# Patient Record
Sex: FEMALE | Race: BLACK OR AFRICAN AMERICAN | Employment: FULL TIME | URBAN - METROPOLITAN AREA
[De-identification: names, ages, dates, MRNs, and addresses within clinical notes are randomized per-mention and may not be internally consistent; named-entity substitution may affect disease eponyms.]

---

## 2017-02-06 ENCOUNTER — PATIENT MESSAGE (OUTPATIENT)
Dept: ENDOCRINOLOGY CLINIC | Facility: CLINIC | Age: 47
End: 2017-02-06

## 2017-02-07 NOTE — TELEPHONE ENCOUNTER
From: Jud Taylor  To: Sara Cesar MD  Sent: 2/6/2017 9:03 PM CST  Subject: Prescription Question    Good Morning Dr. Angelic Oliveros,     I have 4 days left of my prescription. Can I please have a refill sent to [facility] [address] [address].  Jacy Tobar

## 2017-02-09 ENCOUNTER — TELEPHONE (OUTPATIENT)
Dept: ENDOCRINOLOGY CLINIC | Facility: CLINIC | Age: 47
End: 2017-02-09

## 2017-02-09 RX ORDER — LOSARTAN POTASSIUM AND HYDROCHLOROTHIAZIDE 25; 100 MG/1; MG/1
TABLET ORAL
Qty: 90 TABLET | Refills: 0 | Status: SHIPPED | OUTPATIENT
Start: 2017-02-09 | End: 2017-05-10

## 2017-02-09 RX ORDER — AMLODIPINE BESYLATE 2.5 MG/1
TABLET ORAL
Qty: 90 TABLET | Refills: 0 | Status: SHIPPED | OUTPATIENT
Start: 2017-02-09 | End: 2017-05-10

## 2017-02-09 NOTE — TELEPHONE ENCOUNTER
mg tablet refill was sent by Ketan Hall on 2/7/17 to patient's preferred pharmacy. Re-sent Rx. Emailed patient with this information.

## 2017-02-09 NOTE — TELEPHONE ENCOUNTER
Pt. Calling very upset about her Metformin medication 500mg. She states that she was given Metformin 1000mg from [facility]. She is requesting for Rx to be sent for Metformin 500mg. She also states that she is completely out of her meds.  Pt. Is requestin

## 2017-03-03 ENCOUNTER — OFFICE VISIT (OUTPATIENT)
Dept: FAMILY MEDICINE CLINIC | Facility: CLINIC | Age: 47
End: 2017-03-03

## 2017-03-03 ENCOUNTER — APPOINTMENT (OUTPATIENT)
Dept: LAB | Age: 47
End: 2017-03-03
Attending: FAMILY MEDICINE
Payer: COMMERCIAL

## 2017-03-03 VITALS
RESPIRATION RATE: 17 BRPM | SYSTOLIC BLOOD PRESSURE: 138 MMHG | DIASTOLIC BLOOD PRESSURE: 86 MMHG | WEIGHT: 275 LBS | HEIGHT: 64 IN | HEART RATE: 81 BPM | TEMPERATURE: 98 F | BODY MASS INDEX: 46.95 KG/M2

## 2017-03-03 DIAGNOSIS — IMO0001 UNCONTROLLED TYPE 2 DIABETES MELLITUS WITHOUT COMPLICATION, WITHOUT LONG-TERM CURRENT USE OF INSULIN: ICD-10-CM

## 2017-03-03 DIAGNOSIS — E66.01 MORBID OBESITY DUE TO EXCESS CALORIES (HCC): ICD-10-CM

## 2017-03-03 DIAGNOSIS — I10 ESSENTIAL HYPERTENSION: Primary | ICD-10-CM

## 2017-03-03 LAB
ALBUMIN SERPL BCP-MCNC: 3.4 G/DL (ref 3.5–4.8)
ALBUMIN/GLOB SERPL: 0.7 {RATIO} (ref 1–2)
ALP SERPL-CCNC: 63 U/L (ref 32–100)
ALT SERPL-CCNC: 12 U/L (ref 14–54)
ANION GAP SERPL CALC-SCNC: 10 MMOL/L (ref 0–18)
AST SERPL-CCNC: 15 U/L (ref 15–41)
BILIRUB SERPL-MCNC: 0.6 MG/DL (ref 0.3–1.2)
BUN SERPL-MCNC: 10 MG/DL (ref 8–20)
BUN/CREAT SERPL: 14.3 (ref 10–20)
CALCIUM SERPL-MCNC: 9.1 MG/DL (ref 8.5–10.5)
CHLORIDE SERPL-SCNC: 101 MMOL/L (ref 95–110)
CHOLEST SERPL-MCNC: 175 MG/DL (ref 110–200)
CO2 SERPL-SCNC: 26 MMOL/L (ref 22–32)
CREAT SERPL-MCNC: 0.7 MG/DL (ref 0.5–1.5)
GLOBULIN PLAS-MCNC: 4.7 G/DL (ref 2.5–3.7)
GLUCOSE SERPL-MCNC: 177 MG/DL (ref 70–99)
HBA1C MFR BLD: 8 % (ref 4–6)
HDLC SERPL-MCNC: 35 MG/DL
LDLC SERPL CALC-MCNC: 126 MG/DL (ref 0–99)
NONHDLC SERPL-MCNC: 140 MG/DL
OSMOLALITY UR CALC.SUM OF ELEC: 287 MOSM/KG (ref 275–295)
POTASSIUM SERPL-SCNC: 3.4 MMOL/L (ref 3.3–5.1)
PROT SERPL-MCNC: 8.1 G/DL (ref 5.9–8.4)
PROT UR-MCNC: 129 MG/DL
SODIUM SERPL-SCNC: 137 MMOL/L (ref 136–144)
TRIGL SERPL-MCNC: 70 MG/DL (ref 1–149)

## 2017-03-03 PROCEDURE — 84156 ASSAY OF PROTEIN URINE: CPT

## 2017-03-03 PROCEDURE — 80053 COMPREHEN METABOLIC PANEL: CPT

## 2017-03-03 PROCEDURE — 99214 OFFICE O/P EST MOD 30 MIN: CPT | Performed by: FAMILY MEDICINE

## 2017-03-03 PROCEDURE — 36415 COLL VENOUS BLD VENIPUNCTURE: CPT

## 2017-03-03 PROCEDURE — 83036 HEMOGLOBIN GLYCOSYLATED A1C: CPT

## 2017-03-03 PROCEDURE — 99212 OFFICE O/P EST SF 10 MIN: CPT | Performed by: FAMILY MEDICINE

## 2017-03-03 PROCEDURE — 80061 LIPID PANEL: CPT

## 2017-03-03 NOTE — PROGRESS NOTES
HPI:    Patient ID: Fanny Morocho is a 48year old female. Obesity  This is a chronic problem. The current episode started more than 1 year ago. The problem occurs constantly. The problem has been gradually improving.  The symptoms are aggravated by ea Rfl: 3   ibuprofen (MOTRIN) 800 MG Oral Tab Take 1 tablet by mouth every 6 (six) hours as needed for Pain.  Disp: 270 tablet Rfl: 0   Cyclobenzaprine HCl (CYCLOBENZAPRINE) 10 MG Oral Tab Take 1 tablet by mouth 3 (three) times daily as needed for Muscle spas Morbid obesity due to excess calories ([facility] [address]  See # 1      Orders Placed This Encounter  Hemoglobin A1C [E]  Comp Metabolic Panel (14) [E]  Lipid Panel [E]  Protein,Total,Urine, Random [E]    Meds This Visit:  No prescriptions requested or ordered in this e

## 2017-03-03 NOTE — PATIENT INSTRUCTIONS
Comply with medications. Recommend weight loss via daily exercising and consistent healthy dietary changes. Monitor blood pressures and record at home. Limit salt intake.

## 2017-03-06 ENCOUNTER — TELEPHONE (OUTPATIENT)
Dept: ADMINISTRATIVE | Age: 47
End: 2017-03-06

## 2017-03-06 NOTE — TELEPHONE ENCOUNTER
Dr. Tricia Magana,  Pt is requesting intermittent FMLA. May I cover 1 to 3 days per mo X 6 mos for DM and hypertension?   Please advise, thanks Dominique

## 2017-03-07 ENCOUNTER — TELEPHONE (OUTPATIENT)
Dept: ADMINISTRATIVE | Age: 47
End: 2017-03-07

## 2017-03-07 NOTE — TELEPHONE ENCOUNTER
Dr. Eva Butcher,  Good morning,  The patient forgot to mention that she missed work for Diabetes complications on 2-91- and 2-24-17. May I cover those two dates as well.   Also she would like a FMLA for intermittent time for chronic nose bleeds may I continue

## 2017-05-10 RX ORDER — LOSARTAN POTASSIUM AND HYDROCHLOROTHIAZIDE 25; 100 MG/1; MG/1
TABLET ORAL
Qty: 90 TABLET | Refills: 0 | Status: SHIPPED | OUTPATIENT
Start: 2017-05-10 | End: 2017-08-24

## 2017-05-10 RX ORDER — AMLODIPINE BESYLATE 2.5 MG/1
TABLET ORAL
Qty: 90 TABLET | Refills: 0 | Status: SHIPPED | OUTPATIENT
Start: 2017-05-10 | End: 2017-08-24

## 2017-05-13 ENCOUNTER — TELEPHONE (OUTPATIENT)
Dept: FAMILY MEDICINE CLINIC | Facility: CLINIC | Age: 47
End: 2017-05-13

## 2017-05-13 NOTE — TELEPHONE ENCOUNTER
90 DAYS SUPPLY - request please for Atorvastatin 40 mg tablet   Take 1 tablet (40mg total) by mouth nightly  [facility]

## 2017-05-15 RX ORDER — ATORVASTATIN CALCIUM 40 MG/1
40 TABLET, FILM COATED ORAL NIGHTLY
Qty: 90 TABLET | Refills: 0 | Status: SHIPPED | OUTPATIENT
Start: 2017-05-15 | End: 2017-09-14

## 2017-05-15 NOTE — TELEPHONE ENCOUNTER
Requesting Atorvastatin refill    Cholesterol Medications  Protocol Criteria:  · Appointment scheduled in the past 12 months or in the next 3 months  · ALT & LDL on file in the past 12 months  · ALT result < 80  · LDL result <130   Recent Visits       Prov

## 2017-07-24 NOTE — TELEPHONE ENCOUNTER
LOV 12/7/16 with RTC 3 months. No F/U scheduled. Called patient. Left detailed message that she is due for an appt. 1 month refill pending.

## 2017-08-24 ENCOUNTER — OFFICE VISIT (OUTPATIENT)
Dept: FAMILY MEDICINE CLINIC | Facility: CLINIC | Age: 47
End: 2017-08-24

## 2017-08-24 ENCOUNTER — APPOINTMENT (OUTPATIENT)
Dept: LAB | Age: 47
End: 2017-08-24
Attending: FAMILY MEDICINE
Payer: COMMERCIAL

## 2017-08-24 VITALS
WEIGHT: 274 LBS | HEIGHT: 64 IN | DIASTOLIC BLOOD PRESSURE: 88 MMHG | TEMPERATURE: 99 F | RESPIRATION RATE: 17 BRPM | SYSTOLIC BLOOD PRESSURE: 120 MMHG | BODY MASS INDEX: 46.78 KG/M2 | HEART RATE: 99 BPM

## 2017-08-24 DIAGNOSIS — R06.83 SNORING: ICD-10-CM

## 2017-08-24 DIAGNOSIS — I10 ESSENTIAL HYPERTENSION: Primary | ICD-10-CM

## 2017-08-24 DIAGNOSIS — IMO0001 UNCONTROLLED TYPE 2 DIABETES MELLITUS WITHOUT COMPLICATION, WITHOUT LONG-TERM CURRENT USE OF INSULIN: ICD-10-CM

## 2017-08-24 DIAGNOSIS — G47.00 INSOMNIA, UNSPECIFIED TYPE: ICD-10-CM

## 2017-08-24 LAB — HBA1C MFR BLD: 7.9 % (ref 4–6)

## 2017-08-24 PROCEDURE — 99212 OFFICE O/P EST SF 10 MIN: CPT | Performed by: FAMILY MEDICINE

## 2017-08-24 PROCEDURE — 83036 HEMOGLOBIN GLYCOSYLATED A1C: CPT

## 2017-08-24 PROCEDURE — 36415 COLL VENOUS BLD VENIPUNCTURE: CPT

## 2017-08-24 PROCEDURE — 99214 OFFICE O/P EST MOD 30 MIN: CPT | Performed by: FAMILY MEDICINE

## 2017-08-24 RX ORDER — LOSARTAN POTASSIUM AND HYDROCHLOROTHIAZIDE 25; 100 MG/1; MG/1
1 TABLET ORAL
Qty: 90 TABLET | Refills: 0 | Status: SHIPPED | OUTPATIENT
Start: 2017-08-24 | End: 2017-12-26

## 2017-08-24 RX ORDER — AMLODIPINE BESYLATE 2.5 MG/1
2.5 TABLET ORAL
Qty: 90 TABLET | Refills: 0 | Status: SHIPPED | OUTPATIENT
Start: 2017-08-24 | End: 2017-12-26

## 2017-08-24 RX ORDER — ZOLPIDEM TARTRATE 10 MG/1
10 TABLET ORAL NIGHTLY PRN
Qty: 30 TABLET | Refills: 2 | Status: SHIPPED | OUTPATIENT
Start: 2017-08-24 | End: 2019-09-05

## 2017-08-24 NOTE — PATIENT INSTRUCTIONS
Pain management via prescription medications as needed. Monitor blood pressures and record at home. Limit salt intake. Medication reviewed and renewed where needed and appropriate. Comply with medications. Consider melatonin 5 mg orally nightly.   Stand

## 2017-08-24 NOTE — PROGRESS NOTES
HPI:    Patient ID: Fanny Morocoh is a 50year old female. 50year old AA female known HTN/DM with recent constitutional symptoms of menopause now over the last 3 months with several awakenings without need for the restroom per night.  Nose bleeds have blood glucose level 1 time per day Disp: 100 each Rfl: 0   ONETOUCH DELICA LANCETS 82A Does not apply Misc Use to test blood glucose level daily Disp: 100 each Rfl: 0   CloNIDine HCl 0.2 MG/24HR Transdermal Patch Weekly Place 1 patch onto the skin once a w mouth once daily. Dispense: 90 tablet; Refill: 0    2. Insomnia, unspecified type  Work up initiated. See patient instructions  - Zolpidem Tartrate 10 MG Oral Tab; Take 1 tablet (10 mg total) by mouth nightly as needed for Sleep. Dispense: 30 tablet;  Ref

## 2017-08-25 ENCOUNTER — TELEPHONE (OUTPATIENT)
Dept: FAMILY MEDICINE CLINIC | Facility: CLINIC | Age: 47
End: 2017-08-25

## 2017-08-25 DIAGNOSIS — R29.818 SUSPECTED SLEEP APNEA: Primary | ICD-10-CM

## 2017-08-25 NOTE — TELEPHONE ENCOUNTER
Patient does not meet insurance's criteria for an in lab study. Would you like to order a home study which they will approved? Please advise.

## 2017-09-07 ENCOUNTER — TELEPHONE (OUTPATIENT)
Dept: FAMILY MEDICINE CLINIC | Facility: CLINIC | Age: 47
End: 2017-09-07

## 2017-09-07 NOTE — TELEPHONE ENCOUNTER
Dr Margit Goodpasture,     At this time this request does not meet medial necessity for a sleep study. Please contact [facility] at [phone] for a [id number] within the next 24 hours.  Please reference patient's ID # [id number]    Thank you, Viktor

## 2017-09-15 RX ORDER — ATORVASTATIN CALCIUM 40 MG/1
TABLET, FILM COATED ORAL
Qty: 90 TABLET | Refills: 0 | Status: SHIPPED | OUTPATIENT
Start: 2017-09-15 | End: 2018-09-13

## 2017-09-17 NOTE — TELEPHONE ENCOUNTER
I have been out of the office since 09/05/17 and just returning 09/18/17. This should have been sent to my partners. You can find out for me if this is still needed seeing that it was requested 2 weeks ago. Let me know.

## 2017-09-25 NOTE — TELEPHONE ENCOUNTER
This message was sent to the clinical staff as well. It has been determined per a [employer] rep an authorization is not required for a home sleep study. I left a message on patient's vm.      Thank you, Viktor

## 2017-11-16 ENCOUNTER — PATIENT MESSAGE (OUTPATIENT)
Dept: FAMILY MEDICINE CLINIC | Facility: CLINIC | Age: 47
End: 2017-11-16

## 2017-11-16 DIAGNOSIS — E78.5 HYPERLIPIDEMIA, UNSPECIFIED HYPERLIPIDEMIA TYPE: ICD-10-CM

## 2017-11-16 DIAGNOSIS — R73.9 HYPERGLYCEMIA: Primary | ICD-10-CM

## 2017-11-20 NOTE — TELEPHONE ENCOUNTER
From: Zeinab Chaves  To: Alexander Beltre DO  Sent: 11/16/2017 11:59 AM CST  Subject: Other    Good Morning Dr. Omer Grossman,     Is it possible for me to gets labs for AC1, cholesterol, food allegeries etc. I had an allegeric reaction to the cholesterol

## 2017-12-16 NOTE — TELEPHONE ENCOUNTER
Orders on chart. She can come in for these labs at her convenience. There is no allergy test for cholesterol medication. All allergy testing will be done via allergist consult. If she wants that referral go ahead and generate it.

## 2017-12-18 NOTE — TELEPHONE ENCOUNTER
Pt informed of MD recommendation, pt stated understanding.   Pt will do fasting labs test for now,  she declined seeing allergist.

## 2017-12-26 DIAGNOSIS — I10 ESSENTIAL HYPERTENSION: ICD-10-CM

## 2017-12-29 RX ORDER — AMLODIPINE BESYLATE 2.5 MG/1
2.5 TABLET ORAL
Qty: 90 TABLET | Refills: 0 | Status: SHIPPED | OUTPATIENT
Start: 2017-12-29 | End: 2018-03-19

## 2017-12-29 RX ORDER — LOSARTAN POTASSIUM AND HYDROCHLOROTHIAZIDE 25; 100 MG/1; MG/1
1 TABLET ORAL
Qty: 90 TABLET | Refills: 0 | Status: SHIPPED | OUTPATIENT
Start: 2017-12-29 | End: 2018-03-19

## 2017-12-29 NOTE — TELEPHONE ENCOUNTER
Pt is calling in regards of refill request. pls advise. Thank you  Pt is completely out      Current Outpatient Prescriptions:  Zolpidem Tartrate 10 MG Oral Tab Take 1 tablet (10 mg total) by mouth nightly as needed for Sleep. Disp: 30 tablet Rfl: 2   AmLODIPine Besylate 2.5 MG Oral Tab Take 1 tablet (2.5 mg total) by mouth once daily.  Disp: 90 tablet Rfl: 0

## 2017-12-29 NOTE — TELEPHONE ENCOUNTER
Call got transfer to me and then call got disconnect. Pt was called back and she stated that she hung up  and she was very upset that she does not care anymore if we send the medication or not. She stated I already told the other lady that she had a nose bleed. I was trying to ask her what medications she needed and she stated only the amlodipine and losartan. I inform her it would be send and that I just wanted to make sure I had the right medications. Pt then stated do what you have to do and hung up/ I ran the protocol and was able to send to her pharmacy. I also called her pharmacy and they will get it ready for pt asap and if they can call her once it was ready.  Thanks

## 2018-01-30 ENCOUNTER — APPOINTMENT (OUTPATIENT)
Dept: LAB | Age: 48
End: 2018-01-30
Attending: FAMILY MEDICINE
Payer: COMMERCIAL

## 2018-01-30 ENCOUNTER — OFFICE VISIT (OUTPATIENT)
Dept: FAMILY MEDICINE CLINIC | Facility: CLINIC | Age: 48
End: 2018-01-30

## 2018-01-30 VITALS
TEMPERATURE: 99 F | RESPIRATION RATE: 17 BRPM | HEART RATE: 84 BPM | SYSTOLIC BLOOD PRESSURE: 125 MMHG | BODY MASS INDEX: 45.93 KG/M2 | DIASTOLIC BLOOD PRESSURE: 87 MMHG | HEIGHT: 64 IN | WEIGHT: 269 LBS

## 2018-01-30 DIAGNOSIS — E66.01 MORBID OBESITY WITH BMI OF 45.0-49.9, ADULT (HCC): ICD-10-CM

## 2018-01-30 DIAGNOSIS — E78.5 HYPERLIPIDEMIA, UNSPECIFIED HYPERLIPIDEMIA TYPE: ICD-10-CM

## 2018-01-30 DIAGNOSIS — K58.9 IRRITABLE BOWEL SYNDROME, UNSPECIFIED TYPE: ICD-10-CM

## 2018-01-30 DIAGNOSIS — I10 ESSENTIAL HYPERTENSION: ICD-10-CM

## 2018-01-30 DIAGNOSIS — E11.9 TYPE 2 DIABETES MELLITUS WITHOUT COMPLICATION, WITHOUT LONG-TERM CURRENT USE OF INSULIN (HCC): Primary | ICD-10-CM

## 2018-01-30 DIAGNOSIS — R73.9 HYPERGLYCEMIA: ICD-10-CM

## 2018-01-30 LAB
CHOLEST SERPL-MCNC: 176 MG/DL (ref 110–200)
HBA1C MFR BLD: 7.4 % (ref 4–6)
HDLC SERPL-MCNC: 34 MG/DL
LDLC SERPL CALC-MCNC: 125 MG/DL (ref 0–99)
NONHDLC SERPL-MCNC: 142 MG/DL
TRIGL SERPL-MCNC: 85 MG/DL (ref 1–149)

## 2018-01-30 PROCEDURE — 99212 OFFICE O/P EST SF 10 MIN: CPT | Performed by: FAMILY MEDICINE

## 2018-01-30 PROCEDURE — 83036 HEMOGLOBIN GLYCOSYLATED A1C: CPT

## 2018-01-30 PROCEDURE — 36415 COLL VENOUS BLD VENIPUNCTURE: CPT

## 2018-01-30 PROCEDURE — 99214 OFFICE O/P EST MOD 30 MIN: CPT | Performed by: FAMILY MEDICINE

## 2018-01-30 PROCEDURE — 80061 LIPID PANEL: CPT

## 2018-01-30 RX ORDER — HYOSCYAMINE SULFATE 0.125 MG
0.12 TABLET,DISINTEGRATING ORAL EVERY 4 HOURS PRN
Qty: 60 TABLET | Refills: 0 | Status: SHIPPED | OUTPATIENT
Start: 2018-01-30 | End: 2019-08-27

## 2018-01-30 NOTE — PROGRESS NOTES
HPI:    Patient ID: Aminta Yanez is a 50year old female. Hypertension   This is a chronic problem. The current episode started more than 1 year ago. The problem is unchanged. The problem is controlled.  Pertinent negatives include no chest pain, head shortness of breath. Cardiovascular: Negative for chest pain and palpitations. Gastrointestinal: Negative for abdominal pain. Neurological: Negative for headaches.             Current Outpatient Prescriptions:  LOSARTAN POTASSIUM-HCTZ 100-25 MG Oral membrane and ear canal normal.   Nose: Nose normal.   Mouth/Throat: Oropharynx is clear and moist.   Neck: No thyromegaly present. Cardiovascular: Normal rate and regular rhythm. Exam reveals no gallop. Edema not present. Carotid bruit not present.

## 2018-01-30 NOTE — PATIENT INSTRUCTIONS
Recommend weight loss via daily exercising and consistent healthy dietary changes. Monitor blood pressures and record at home. Limit salt intake. Comply with medications. Medication reviewed and renewed where needed and appropriate.   IBS medication danilo

## 2018-02-22 ENCOUNTER — TELEPHONE (OUTPATIENT)
Dept: ADMINISTRATIVE | Age: 48
End: 2018-02-22

## 2018-02-22 NOTE — TELEPHONE ENCOUNTER
Pt paid $25 over the phone, receipt emailed to pt, FABI packet emailed to pt. Pt will email back to us + FMLA form. Pending processing.  NK

## 2018-03-06 ENCOUNTER — PATIENT MESSAGE (OUTPATIENT)
Dept: FAMILY MEDICINE CLINIC | Facility: CLINIC | Age: 48
End: 2018-03-06

## 2018-03-06 NOTE — TELEPHONE ENCOUNTER
Dr. Campos Cabrera,      Sending X 2 forms both FMLA   Intermittent time off for both     #1. HTN 1 day per mo   #2. DM  1 to 2 days per mo     If you approve please sign both forms   THX D     Please sign off on form:  -Highlight the patient and hit \"Chart\" button. -In Chart Review, w/in the Encounter tab - click 1 time on the Telephone call encounter . Scroll down the telephone encounter.  -Click \"scan on\" blue Hyperlink under \"Media\" heading for PPD Dr[address] forms 3-6-18.  w/in the telephone enc.  -Click on Acknowledge button at the bottom right corner and left-click onto image, signature stamp appears and drag signature to Provider signature line. Stamp will turn blue. Close window.     Thank you,  Francy Lopez

## 2018-03-07 NOTE — TELEPHONE ENCOUNTER
Both forms faxed to [facility] (X2) and mailed, scanned, copies mailed to pt.  NK

## 2018-03-09 NOTE — TELEPHONE ENCOUNTER
Please see most recent message below:     From: [address]: 3/9/2018 10:45 AM CST  To: Em Rn Triage  Subject: RE: RE: Alison Duron,    I need the papers approved by Dr. Margit Goodpasture before 3/15.  He didnt put notes for approval on my last visit

## 2018-03-13 NOTE — TELEPHONE ENCOUNTER
Please note both FMLA forms were completed, faxed and mld to her FMLA HR co. PT was also sent copies.   JAJA

## 2018-03-19 DIAGNOSIS — I10 ESSENTIAL HYPERTENSION: ICD-10-CM

## 2018-03-21 NOTE — TELEPHONE ENCOUNTER
Hypertensive Medications  Protocol Criteria:  · Appointment scheduled in the past 6 months or in the next 3 months  · BMP or CMP in the past 12 months  · Creatinine result < 2  Recent Outpatient Visits            1 month ago Type 2 diabetes mellitus withou Sig: TAKE 1 TABLET (2.5 MG TOTAL) BY MOUTH ONCE DAILY. LR 12-29-17 # 90  Unable to refill per protocol. pls advise, thanks.

## 2018-03-22 RX ORDER — AMLODIPINE BESYLATE 2.5 MG/1
2.5 TABLET ORAL
Qty: 90 TABLET | Refills: 0 | Status: SHIPPED | OUTPATIENT
Start: 2018-03-22 | End: 2018-06-19

## 2018-03-22 RX ORDER — LOSARTAN POTASSIUM AND HYDROCHLOROTHIAZIDE 25; 100 MG/1; MG/1
1 TABLET ORAL
Qty: 90 TABLET | Refills: 0 | Status: SHIPPED | OUTPATIENT
Start: 2018-03-22 | End: 2018-06-19

## 2018-06-19 DIAGNOSIS — I10 ESSENTIAL HYPERTENSION: ICD-10-CM

## 2018-06-20 RX ORDER — LOSARTAN POTASSIUM AND HYDROCHLOROTHIAZIDE 25; 100 MG/1; MG/1
1 TABLET ORAL
Qty: 90 TABLET | Refills: 0 | Status: SHIPPED | OUTPATIENT
Start: 2018-06-20 | End: 2018-09-17

## 2018-06-20 RX ORDER — AMLODIPINE BESYLATE 2.5 MG/1
2.5 TABLET ORAL
Qty: 90 TABLET | Refills: 0 | Status: SHIPPED | OUTPATIENT
Start: 2018-06-20 | End: 2018-09-17

## 2018-08-24 ENCOUNTER — OFFICE VISIT (OUTPATIENT)
Dept: FAMILY MEDICINE CLINIC | Facility: CLINIC | Age: 48
End: 2018-08-24
Payer: COMMERCIAL

## 2018-08-24 ENCOUNTER — HOSPITAL ENCOUNTER (OUTPATIENT)
Dept: GENERAL RADIOLOGY | Age: 48
Discharge: HOME | End: 2018-08-24
Attending: FAMILY MEDICINE
Payer: COMMERCIAL

## 2018-08-24 VITALS
TEMPERATURE: 98 F | WEIGHT: 260 LBS | SYSTOLIC BLOOD PRESSURE: 118 MMHG | BODY MASS INDEX: 44.39 KG/M2 | DIASTOLIC BLOOD PRESSURE: 83 MMHG | HEIGHT: 64 IN | HEART RATE: 96 BPM

## 2018-08-24 DIAGNOSIS — M99.01 CERVICOTHORACIC SOMATIC DYSFUNCTION: ICD-10-CM

## 2018-08-24 DIAGNOSIS — R20.0 LEFT ARM NUMBNESS: ICD-10-CM

## 2018-08-24 DIAGNOSIS — M54.2 CERVICAL PAIN (NECK): ICD-10-CM

## 2018-08-24 DIAGNOSIS — I10 ESSENTIAL HYPERTENSION: Primary | ICD-10-CM

## 2018-08-24 PROCEDURE — 98927 OSTEOPATH MANJ 5-6 REGIONS: CPT | Performed by: FAMILY MEDICINE

## 2018-08-24 PROCEDURE — 72050 X-RAY EXAM NECK SPINE 4/5VWS: CPT | Performed by: FAMILY MEDICINE

## 2018-08-24 PROCEDURE — 99212 OFFICE O/P EST SF 10 MIN: CPT | Performed by: FAMILY MEDICINE

## 2018-08-24 PROCEDURE — 99214 OFFICE O/P EST MOD 30 MIN: CPT | Performed by: FAMILY MEDICINE

## 2018-08-24 NOTE — PROGRESS NOTES
HPI:    Patient ID: Jeanette García is a 46year old female. Hypertension   This is a chronic problem. The current episode started more than 1 year ago. The problem has been waxing and waning since onset. The problem is controlled.  Associated symptoms i (MOTRIN) 800 MG Oral Tab Take 1 tablet by mouth every 6 (six) hours as needed for Pain. Disp: 270 tablet Rfl: 0   Cyclobenzaprine HCl (CYCLOBENZAPRINE) 10 MG Oral Tab Take 1 tablet by mouth 3 (three) times daily as needed for Muscle spasms.  Disp: 270 table motion, tenderness, pain and spasm. Thoracic back: She exhibits pain and spasm. Neurological: She is alert and oriented to person, place, and time. No cranial nerve deficit. ASSESSMENT/PLAN:   1.  Essential hypertension  To goal on cur

## 2018-08-24 NOTE — PATIENT INSTRUCTIONS
Cervical xray ordered. Consider EMG and ortho referral.  Recommend weight loss via daily exercising and consistent healthy dietary changes. Monitor blood pressures and record at home. Limit salt intake.   Medication reviewed and renewed where needed and a

## 2018-08-30 ENCOUNTER — TELEPHONE (OUTPATIENT)
Dept: ADMINISTRATIVE | Age: 48
End: 2018-08-30

## 2018-09-10 NOTE — TELEPHONE ENCOUNTER
Dr. Millicent Rodriguez    Pt exceeded parameters of FMLA. Missed work X 5 days last month. Form revised to cover 8-28-18 and 8-29-18. Also 1 to 3 days of intermittent time off if you approve.        Please sign off on form:  -Highlight the patient and hit Time Mcdonald

## 2018-09-13 ENCOUNTER — OFFICE VISIT (OUTPATIENT)
Dept: FAMILY MEDICINE CLINIC | Facility: CLINIC | Age: 48
End: 2018-09-13
Payer: COMMERCIAL

## 2018-09-13 VITALS
DIASTOLIC BLOOD PRESSURE: 85 MMHG | SYSTOLIC BLOOD PRESSURE: 118 MMHG | BODY MASS INDEX: 44.39 KG/M2 | WEIGHT: 260 LBS | HEIGHT: 64 IN | RESPIRATION RATE: 17 BRPM | HEART RATE: 92 BPM

## 2018-09-13 DIAGNOSIS — Z12.11 COLON CANCER SCREENING: ICD-10-CM

## 2018-09-13 DIAGNOSIS — Z01.419 ENCOUNTER FOR GYNECOLOGICAL EXAMINATION: ICD-10-CM

## 2018-09-13 DIAGNOSIS — M99.01 CERVICOTHORACIC SOMATIC DYSFUNCTION: Primary | ICD-10-CM

## 2018-09-13 PROCEDURE — 99214 OFFICE O/P EST MOD 30 MIN: CPT | Performed by: FAMILY MEDICINE

## 2018-09-13 PROCEDURE — 99212 OFFICE O/P EST SF 10 MIN: CPT | Performed by: FAMILY MEDICINE

## 2018-09-13 PROCEDURE — 98925 OSTEOPATH MANJ 1-2 REGIONS: CPT | Performed by: FAMILY MEDICINE

## 2018-09-13 NOTE — PROGRESS NOTES
HPI:    Patient ID: Symone Daily is a 46year old female. 46year old AA female also needs her initial colonoscopy. Wants gyne referral for routine gyne care. Neck Pain    This is a new problem. The current episode started 1 to 4 weeks ago.  The by mouth. take 1 Tablet (0.5MG)  by ORAL route 2 times every day as needed Disp:  Rfl:      Allergies:  Cherry                       09/13/18  1628   BP: 118/85   Pulse: 92   Resp: 17       PHYSICAL EXAM:   Physical Exam    Constitutional: She is obese.  Sh

## 2018-09-13 NOTE — PATIENT INSTRUCTIONS
OMT done. Medication reviewed and renewed where needed and appropriate. Comply with medications. Recommend weight loss via daily exercising and consistent healthy dietary changes. Monitor blood pressures and record at home. Limit salt intake.   Pain man

## 2018-09-17 DIAGNOSIS — I10 ESSENTIAL HYPERTENSION: ICD-10-CM

## 2018-09-20 RX ORDER — AMLODIPINE BESYLATE 2.5 MG/1
2.5 TABLET ORAL
Qty: 90 TABLET | Refills: 0 | Status: SHIPPED | OUTPATIENT
Start: 2018-09-20 | End: 2018-12-17

## 2018-09-20 RX ORDER — LOSARTAN POTASSIUM AND HYDROCHLOROTHIAZIDE 25; 100 MG/1; MG/1
TABLET ORAL
Qty: 90 TABLET | Refills: 0 | Status: SHIPPED | OUTPATIENT
Start: 2018-09-20 | End: 2018-12-17

## 2018-09-20 NOTE — TELEPHONE ENCOUNTER
Please advise on refill request.   Protocol failed due to no recent labs.      Hypertensive Medications  Protocol Criteria:  · Appointment scheduled in the past 6 months or in the next 3 months  · BMP or CMP in the past 12 months  · Creatinine result < 2  R

## 2018-10-08 ENCOUNTER — OFFICE VISIT (OUTPATIENT)
Dept: FAMILY MEDICINE CLINIC | Facility: CLINIC | Age: 48
End: 2018-10-08
Payer: COMMERCIAL

## 2018-10-08 VITALS
HEIGHT: 64 IN | RESPIRATION RATE: 17 BRPM | HEART RATE: 98 BPM | BODY MASS INDEX: 44.39 KG/M2 | WEIGHT: 260 LBS | SYSTOLIC BLOOD PRESSURE: 110 MMHG | DIASTOLIC BLOOD PRESSURE: 84 MMHG

## 2018-10-08 DIAGNOSIS — M62.830 SPASM OF MUSCLE OF LOWER BACK: Primary | ICD-10-CM

## 2018-10-08 DIAGNOSIS — M99.01 CERVICAL SOMATIC DYSFUNCTION: ICD-10-CM

## 2018-10-08 DIAGNOSIS — M99.02 SOMATIC DYSFUNCTION OF THORACOLUMBAR REGION: ICD-10-CM

## 2018-10-08 PROCEDURE — 98926 OSTEOPATH MANJ 3-4 REGIONS: CPT | Performed by: FAMILY MEDICINE

## 2018-10-08 PROCEDURE — 99212 OFFICE O/P EST SF 10 MIN: CPT | Performed by: FAMILY MEDICINE

## 2018-10-08 PROCEDURE — 99213 OFFICE O/P EST LOW 20 MIN: CPT | Performed by: FAMILY MEDICINE

## 2018-10-08 RX ORDER — CYCLOBENZAPRINE HCL 10 MG
10 TABLET ORAL 3 TIMES DAILY PRN
Qty: 270 TABLET | Refills: 0 | Status: SHIPPED | OUTPATIENT
Start: 2018-10-08 | End: 2021-11-04

## 2018-10-08 NOTE — PROCEDURES
Multiple vertebral segments in cervical and lumbar and thoracic region misaligned. Manual osteopathic manipulative therapy performed and immediate relief obtained. Patient instantaneously improved.

## 2018-10-08 NOTE — PROGRESS NOTES
HPI:    Patient ID: Gladys Fox is a 46year old female. Back Pain   This is a recurrent problem. The problem occurs constantly. The problem has been waxing and waning since onset. The pain is present in the lumbar spine and thoracic spine.  The qual (six) hours as needed for Pain. Disp: 270 tablet Rfl: 0   LORazepam (ATIVAN) 0.5 MG Oral Tab Take  by mouth.  take 1 Tablet (0.5MG)  by ORAL route 2 times every day as needed Disp:  Rfl:      Allergies:  Cherry                       10/08/18  8992   BP: 110 changes. Monitor blood pressures and record at home. Limit salt intake. Return in about 3 months (around 1/8/2019), or if symptoms worsen or fail to improve.          MD#[id number]

## 2018-10-08 NOTE — PATIENT INSTRUCTIONS
OMT done. Medication reviewed and renewed where needed and appropriate (flexeril). Comply with medications. Recommend weight loss via daily exercising and consistent healthy dietary changes. Monitor blood pressures and record at home.  Limit salt intake

## 2018-12-17 DIAGNOSIS — I10 ESSENTIAL HYPERTENSION: ICD-10-CM

## 2018-12-18 RX ORDER — LOSARTAN POTASSIUM AND HYDROCHLOROTHIAZIDE 25; 100 MG/1; MG/1
TABLET ORAL
Qty: 90 TABLET | Refills: 0 | Status: SHIPPED | OUTPATIENT
Start: 2018-12-18 | End: 2019-03-25

## 2018-12-18 RX ORDER — AMLODIPINE BESYLATE 2.5 MG/1
TABLET ORAL
Qty: 90 TABLET | Refills: 0 | Status: SHIPPED | OUTPATIENT
Start: 2018-12-18 | End: 2018-12-20

## 2018-12-20 DIAGNOSIS — I10 ESSENTIAL HYPERTENSION: ICD-10-CM

## 2018-12-21 RX ORDER — AMLODIPINE BESYLATE 2.5 MG/1
2.5 TABLET ORAL
Qty: 90 TABLET | Refills: 0 | Status: SHIPPED | OUTPATIENT
Start: 2018-12-21 | End: 2019-03-25

## 2018-12-25 DIAGNOSIS — I10 ESSENTIAL HYPERTENSION: ICD-10-CM

## 2018-12-27 ENCOUNTER — PATIENT MESSAGE (OUTPATIENT)
Dept: OTHER | Age: 48
End: 2018-12-27

## 2018-12-27 RX ORDER — LOSARTAN POTASSIUM AND HYDROCHLOROTHIAZIDE 25; 100 MG/1; MG/1
1 TABLET ORAL
Qty: 90 TABLET | Refills: 0 | OUTPATIENT
Start: 2018-12-27

## 2018-12-27 NOTE — TELEPHONE ENCOUNTER
Received call from the patient. States she needs a refill for her losartan-HCTZ tablets. Per chart Rx was refilled for a 90 day supply on 12/18/18 by Dr. Millicent Rodriguez. Called the pharmacy with the patient. Per pharmacist e-script was not received.  Provided sherman

## 2018-12-27 NOTE — TELEPHONE ENCOUNTER
Left a complete message to patient's VM (HIPAA) that her medication Losartan was refilled on 12/18/18 and she should call her pharmacy if it is ready for , no need to call us back but with question or concern can call our office at [phone].

## 2019-01-24 ENCOUNTER — OFFICE VISIT (OUTPATIENT)
Dept: FAMILY MEDICINE CLINIC | Facility: CLINIC | Age: 49
End: 2019-01-24
Payer: COMMERCIAL

## 2019-01-24 VITALS
HEIGHT: 64 IN | SYSTOLIC BLOOD PRESSURE: 114 MMHG | RESPIRATION RATE: 17 BRPM | WEIGHT: 260 LBS | BODY MASS INDEX: 44.39 KG/M2 | DIASTOLIC BLOOD PRESSURE: 78 MMHG | HEART RATE: 99 BPM

## 2019-01-24 DIAGNOSIS — R07.82 INTERCOSTAL PAIN: ICD-10-CM

## 2019-01-24 DIAGNOSIS — M99.01 CERVICOTHORACIC SOMATIC DYSFUNCTION: Primary | ICD-10-CM

## 2019-01-24 PROCEDURE — 99214 OFFICE O/P EST MOD 30 MIN: CPT | Performed by: FAMILY MEDICINE

## 2019-01-24 PROCEDURE — 98925 OSTEOPATH MANJ 1-2 REGIONS: CPT | Performed by: FAMILY MEDICINE

## 2019-01-24 PROCEDURE — 99212 OFFICE O/P EST SF 10 MIN: CPT | Performed by: FAMILY MEDICINE

## 2019-01-24 PROCEDURE — 93005 ELECTROCARDIOGRAM TRACING: CPT | Performed by: FAMILY MEDICINE

## 2019-01-24 PROCEDURE — 93000 ELECTROCARDIOGRAM COMPLETE: CPT | Performed by: FAMILY MEDICINE

## 2019-01-24 NOTE — PROGRESS NOTES
HPI:    Patient ID: Laurie Cleaning is a 46year old female. Hypertension   This is a chronic problem. The current episode started more than 1 year ago. The problem is unchanged. The problem is controlled. Associated symptoms include chest pain.  Pertine ibuprofen (MOTRIN) 800 MG Oral Tab Take 1 tablet by mouth every 6 (six) hours as needed for Pain. Disp: 270 tablet Rfl: 0   LORazepam (ATIVAN) 0.5 MG Oral Tab Take  by mouth.  take 1 Tablet (0.5MG)  by ORAL route 2 times every day as needed Disp:  Rfl: medications. Recommend weight loss via daily exercising and consistent healthy dietary changes. Return in about 6 weeks (around 3/7/2019), or if symptoms worsen or fail to improve.          #[id number]

## 2019-01-24 NOTE — PATIENT INSTRUCTIONS
OMT done. Monitor blood pressures and record at home. Limit salt intake. Medication reviewed and renewed where needed and appropriate. Comply with medications. Recommend weight loss via daily exercising and consistent healthy dietary changes.

## 2019-03-05 ENCOUNTER — TELEPHONE (OUTPATIENT)
Dept: ADMINISTRATIVE | Age: 49
End: 2019-03-05

## 2019-03-06 NOTE — TELEPHONE ENCOUNTER
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 Logged for processing. FABI packet emailed to pt [email or web address].  NK

## 2019-03-08 ENCOUNTER — OFFICE VISIT (OUTPATIENT)
Dept: FAMILY MEDICINE CLINIC | Facility: CLINIC | Age: 49
End: 2019-03-08
Payer: COMMERCIAL

## 2019-03-08 ENCOUNTER — APPOINTMENT (OUTPATIENT)
Dept: LAB | Age: 49
End: 2019-03-08
Attending: FAMILY MEDICINE
Payer: COMMERCIAL

## 2019-03-08 VITALS
HEIGHT: 64 IN | DIASTOLIC BLOOD PRESSURE: 89 MMHG | BODY MASS INDEX: 45 KG/M2 | SYSTOLIC BLOOD PRESSURE: 114 MMHG | RESPIRATION RATE: 17 BRPM | HEART RATE: 75 BPM

## 2019-03-08 DIAGNOSIS — E11.9 TYPE 2 DIABETES MELLITUS WITHOUT COMPLICATION, WITHOUT LONG-TERM CURRENT USE OF INSULIN (HCC): ICD-10-CM

## 2019-03-08 DIAGNOSIS — R79.89 ABNORMAL TSH: ICD-10-CM

## 2019-03-08 DIAGNOSIS — M99.01 CERVICAL SOMATIC DYSFUNCTION: Primary | ICD-10-CM

## 2019-03-08 DIAGNOSIS — R10.84 GENERALIZED ABDOMINAL PAIN: ICD-10-CM

## 2019-03-08 DIAGNOSIS — M99.02 SOMATIC DYSFUNCTION OF THORACOLUMBAR REGION: ICD-10-CM

## 2019-03-08 LAB
ALBUMIN SERPL-MCNC: 3.4 G/DL (ref 3.4–5)
ALBUMIN/GLOB SERPL: 0.7 {RATIO} (ref 1–2)
ALP LIVER SERPL-CCNC: 66 U/L (ref 39–100)
ALT SERPL-CCNC: 14 U/L (ref 13–56)
ANION GAP SERPL CALC-SCNC: 6 MMOL/L (ref 0–18)
AST SERPL-CCNC: 10 U/L (ref 15–37)
BILIRUB SERPL-MCNC: 0.4 MG/DL (ref 0.1–2)
BUN BLD-MCNC: 6 MG/DL (ref 7–18)
BUN/CREAT SERPL: 8.6 (ref 10–20)
CALCIUM BLD-MCNC: 9 MG/DL (ref 8.5–10.1)
CHLORIDE SERPL-SCNC: 105 MMOL/L (ref 98–107)
CHOLEST SMN-MCNC: 173 MG/DL (ref ?–200)
CO2 SERPL-SCNC: 29 MMOL/L (ref 21–32)
CREAT BLD-MCNC: 0.7 MG/DL (ref 0.55–1.02)
EST. AVERAGE GLUCOSE BLD GHB EST-MCNC: 151 MG/DL (ref 68–126)
GLOBULIN PLAS-MCNC: 4.6 G/DL (ref 2.8–4.4)
GLUCOSE BLD-MCNC: 105 MG/DL (ref 70–99)
HBA1C MFR BLD HPLC: 6.9 % (ref ?–5.7)
HDLC SERPL-MCNC: 48 MG/DL (ref 40–59)
LDLC SERPL CALC-MCNC: 113 MG/DL (ref ?–100)
M PROTEIN MFR SERPL ELPH: 8 G/DL (ref 6.4–8.2)
NONHDLC SERPL-MCNC: 125 MG/DL (ref ?–130)
OSMOLALITY SERPL CALC.SUM OF ELEC: 288 MOSM/KG (ref 275–295)
POTASSIUM SERPL-SCNC: 3.6 MMOL/L (ref 3.5–5.1)
PROT UR-MCNC: 6.5 MG/DL
SODIUM SERPL-SCNC: 140 MMOL/L (ref 136–145)
TRIGL SERPL-MCNC: 58 MG/DL (ref 30–149)
TSI SER-ACNC: 2.54 MIU/ML (ref 0.36–3.74)
VLDLC SERPL CALC-MCNC: 12 MG/DL (ref 0–30)

## 2019-03-08 PROCEDURE — 84443 ASSAY THYROID STIM HORMONE: CPT

## 2019-03-08 PROCEDURE — 80061 LIPID PANEL: CPT

## 2019-03-08 PROCEDURE — 84156 ASSAY OF PROTEIN URINE: CPT

## 2019-03-08 PROCEDURE — 99214 OFFICE O/P EST MOD 30 MIN: CPT | Performed by: FAMILY MEDICINE

## 2019-03-08 PROCEDURE — 98926 OSTEOPATH MANJ 3-4 REGIONS: CPT | Performed by: FAMILY MEDICINE

## 2019-03-08 PROCEDURE — 99212 OFFICE O/P EST SF 10 MIN: CPT | Performed by: FAMILY MEDICINE

## 2019-03-08 PROCEDURE — 80053 COMPREHEN METABOLIC PANEL: CPT

## 2019-03-08 PROCEDURE — 83036 HEMOGLOBIN GLYCOSYLATED A1C: CPT

## 2019-03-08 PROCEDURE — 36415 COLL VENOUS BLD VENIPUNCTURE: CPT

## 2019-03-08 RX ORDER — EMPAGLIFLOZIN, METFORMIN HYDROCHLORIDE 12.5; 1 MG/1; MG/1
TABLET, EXTENDED RELEASE ORAL
Refills: 0 | COMMUNITY
Start: 2019-02-04 | End: 2019-03-08

## 2019-03-08 NOTE — PROGRESS NOTES
HPI:    Patient ID: Amandeep Groves is a 46year old female. Patient is a 66-year-old hypertensive diabetic patient known to the clinic here for status update regarding those 2 chronic illnesses.   But more acutely the patient is here regarding a general mouth every 6 (six) hours as needed for Pain. Disp: 270 tablet Rfl: 0   LORazepam (ATIVAN) 0.5 MG Oral Tab Take  by mouth.  take 1 Tablet (0.5MG)  by ORAL route 2 times every day as needed Disp:  Rfl:      Allergies:  Lashell Beans                       03/08/19 TSH W REFLEX TO FREE T4; Future    No orders of the defined types were placed in this encounter.       Meds This Visit:  Requested Prescriptions      No prescriptions requested or ordered in this encounter       Imaging & Referrals:  None  Patient Instructi

## 2019-03-08 NOTE — PROCEDURES
Multiple vertebral segments in cervical and thoracic and lumbosacral region misaligned. Manual osteopathic manipulative therapy performed and immediate relief obtained. Patient instantaneously improved.

## 2019-03-11 NOTE — TELEPHONE ENCOUNTER
Dr. Taylor Alonso    Please sign off on form:  -Highlight the patient and hit \"Chart\" button. -In Chart Review, w/in the Encounter tab - click 1 time on the Telephone call encounter for 3/5/19.  Scroll down the telephone encounter.  -Click \"scan on\" blue Hy

## 2019-03-12 NOTE — TELEPHONE ENCOUNTER
FMLA forms faxed to [facility] AT [facility] [phone], copy mailed to pt.

## 2019-03-25 DIAGNOSIS — I10 ESSENTIAL HYPERTENSION: ICD-10-CM

## 2019-03-25 RX ORDER — AMLODIPINE BESYLATE 2.5 MG/1
2.5 TABLET ORAL
Qty: 90 TABLET | Refills: 0 | Status: SHIPPED | OUTPATIENT
Start: 2019-03-25 | End: 2019-12-31

## 2019-03-25 RX ORDER — LOSARTAN POTASSIUM AND HYDROCHLOROTHIAZIDE 25; 100 MG/1; MG/1
TABLET ORAL
Qty: 90 TABLET | Refills: 0 | Status: SHIPPED | OUTPATIENT
Start: 2019-03-25 | End: 2019-08-27

## 2019-03-25 NOTE — TELEPHONE ENCOUNTER
Patient called states she is out of medication and pharmacy [facility] losartan/hctz. She was told they have amlodipine ready for her. Pharmacist contacted and did receive e-scripts with no problem.      Refill passed per [facility]

## 2019-04-08 ENCOUNTER — PATIENT MESSAGE (OUTPATIENT)
Dept: FAMILY MEDICINE CLINIC | Facility: CLINIC | Age: 49
End: 2019-04-08

## 2019-04-08 ENCOUNTER — NURSE TRIAGE (OUTPATIENT)
Dept: OTHER | Age: 49
End: 2019-04-08

## 2019-04-09 RX ORDER — FLUCONAZOLE 150 MG/1
150 TABLET ORAL ONCE
Qty: 1 TABLET | Refills: 0 | Status: SHIPPED | OUTPATIENT
Start: 2019-04-09 | End: 2019-04-09

## 2019-04-09 NOTE — TELEPHONE ENCOUNTER
From: Cathaleen Severin  To: Sangeetha Livingston DO  Sent: 4/8/2019 7:03 PM CDT  Subject: Other    Good Evening,    I have developed a yeast infection. Can prescription be sent to this [address], [address] [place] [address].      Thanks in advance & have a n

## 2019-04-29 ENCOUNTER — TELEPHONE (OUTPATIENT)
Dept: OTHER | Age: 49
End: 2019-04-29

## 2019-04-29 ENCOUNTER — OFFICE VISIT (OUTPATIENT)
Dept: FAMILY MEDICINE CLINIC | Facility: CLINIC | Age: 49
End: 2019-04-29
Payer: COMMERCIAL

## 2019-04-29 VITALS
WEIGHT: 256 LBS | HEART RATE: 108 BPM | DIASTOLIC BLOOD PRESSURE: 69 MMHG | RESPIRATION RATE: 17 BRPM | HEIGHT: 64 IN | BODY MASS INDEX: 43.71 KG/M2 | SYSTOLIC BLOOD PRESSURE: 112 MMHG

## 2019-04-29 DIAGNOSIS — B37.3 VAGINAL YEAST INFECTION: ICD-10-CM

## 2019-04-29 DIAGNOSIS — N89.8 VAGINAL DISCHARGE: ICD-10-CM

## 2019-04-29 DIAGNOSIS — R35.0 URINARY FREQUENCY: ICD-10-CM

## 2019-04-29 DIAGNOSIS — F43.29 STRESS AND ADJUSTMENT REACTION: ICD-10-CM

## 2019-04-29 DIAGNOSIS — B37.3 VAGINAL YEAST INFECTION: Primary | ICD-10-CM

## 2019-04-29 DIAGNOSIS — N91.2 AMENORRHEA: ICD-10-CM

## 2019-04-29 PROCEDURE — 99214 OFFICE O/P EST MOD 30 MIN: CPT | Performed by: FAMILY MEDICINE

## 2019-04-29 PROCEDURE — 81025 URINE PREGNANCY TEST: CPT | Performed by: FAMILY MEDICINE

## 2019-04-29 PROCEDURE — 99212 OFFICE O/P EST SF 10 MIN: CPT | Performed by: FAMILY MEDICINE

## 2019-04-29 PROCEDURE — 81003 URINALYSIS AUTO W/O SCOPE: CPT | Performed by: FAMILY MEDICINE

## 2019-04-29 RX ORDER — FLUCONAZOLE 150 MG/1
150 TABLET ORAL ONCE
Qty: 1 TABLET | Refills: 0 | Status: SHIPPED | OUTPATIENT
Start: 2019-04-29 | End: 2019-04-29

## 2019-04-29 NOTE — PROGRESS NOTES
HPI:    Patient ID: Fanny Morocho is a 46year old female. Patient is a 49-year-old -American female well-known to the clinic hypertensive diabetic here with a reoccurrence of symptoms consistent with yeast vaginitis.   She does have vaginal irr level daily Disp: 100 each Rfl: 0   ibuprofen (MOTRIN) 800 MG Oral Tab Take 1 tablet by mouth every 6 (six) hours as needed for Pain. Disp: 270 tablet Rfl: 0   LORazepam (ATIVAN) 0.5 MG Oral Tab Take  by mouth.  take 1 Tablet (0.5MG)  by ORAL route 2 times Imaging & Referrals:  None  Patient Instructions   Medication reviewed and renewed where needed and appropriate. Recommend weight loss via daily exercising and consistent healthy dietary changes. Monitor blood pressures and record at home.  Limit sa

## 2019-04-29 NOTE — PATIENT INSTRUCTIONS
Medication reviewed and renewed where needed and appropriate. Recommend weight loss via daily exercising and consistent healthy dietary changes. Monitor blood pressures and record at home. Limit salt intake.   Encouraged physical fitness and daily physica

## 2019-07-11 ENCOUNTER — TELEPHONE (OUTPATIENT)
Dept: FAMILY MEDICINE CLINIC | Facility: CLINIC | Age: 49
End: 2019-07-11

## 2019-07-11 RX ORDER — HYDROCHLOROTHIAZIDE 25 MG/1
25 TABLET ORAL DAILY
Qty: 90 TABLET | Refills: 0 | Status: SHIPPED | OUTPATIENT
Start: 2019-07-11 | End: 2019-09-05

## 2019-07-11 RX ORDER — LOSARTAN POTASSIUM 100 MG/1
100 TABLET ORAL DAILY
Qty: 90 TABLET | Refills: 0 | Status: SHIPPED | OUTPATIENT
Start: 2019-07-11 | End: 2019-09-05

## 2019-07-11 NOTE — TELEPHONE ENCOUNTER
Per pt, she is out of her BP med and her pharmacy told her that that was sent 3times already but nothing was received,  Pt stts that it needs to be  due to it is out of stock her rx med LOSARTAN POTASSIUM-HCTZ , Per pt she needs refills so that sh

## 2019-07-11 NOTE — TELEPHONE ENCOUNTER
Dr Jacquelene Severe, LOSARTAN/HCTZ 100/25 on 's back order, please see pending rx for [id number] and hydrochlorothiazide 25

## 2019-07-11 NOTE — TELEPHONE ENCOUNTER
Pt called back, have been calling pharmacy and they told her they called us, advised no documentation, Pt need RX in separate rx d/t rx on back order    Advised we will call her back   Sent to covering

## 2019-07-11 NOTE — TELEPHONE ENCOUNTER
[facility] one will be blood pressure medicine and the water pill     Pt completely out of medicine

## 2019-07-23 ENCOUNTER — TELEPHONE (OUTPATIENT)
Dept: OTHER | Age: 49
End: 2019-07-23

## 2019-07-23 NOTE — TELEPHONE ENCOUNTER
Pt informed of CMW's recommendations below and pt agrees.  Scheduled appt with CMW tomorrow at 6 PM.

## 2019-07-23 NOTE — TELEPHONE ENCOUNTER
Pt contacts clinic. Seen at [facility] urgent care for hand pain. Was told she has carpal tunnel syndrome and advised to avoid repetitive movement. 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   Steroids were prescribed but she is

## 2019-07-23 NOTE — TELEPHONE ENCOUNTER
She should obtain a wrist splint. The wrist splint should have a firmer piece so that the wrist is kept in neutral position. She should sleep in the wrist splint and can use it during the day as needed.   To make a follow-up appointment with me, we do hav

## 2019-07-24 ENCOUNTER — OFFICE VISIT (OUTPATIENT)
Dept: FAMILY MEDICINE CLINIC | Facility: CLINIC | Age: 49
End: 2019-07-24
Payer: COMMERCIAL

## 2019-07-24 VITALS
DIASTOLIC BLOOD PRESSURE: 80 MMHG | HEART RATE: 82 BPM | HEIGHT: 63.5 IN | SYSTOLIC BLOOD PRESSURE: 125 MMHG | TEMPERATURE: 99 F | BODY MASS INDEX: 44.45 KG/M2 | WEIGHT: 254 LBS

## 2019-07-24 DIAGNOSIS — G56.02 LEFT CARPAL TUNNEL SYNDROME: Primary | ICD-10-CM

## 2019-07-24 PROCEDURE — 99213 OFFICE O/P EST LOW 20 MIN: CPT | Performed by: FAMILY MEDICINE

## 2019-07-24 PROCEDURE — A4570 SPLINT: HCPCS | Performed by: FAMILY MEDICINE

## 2019-07-24 NOTE — PROGRESS NOTES
HPI: Santhosh Vaughan is a 44year old female who presents for left carpal tunnel syndrome. Pain started last Sunday. Went to Urgent Care. Xray was negative. Started on Prednisone. States fingers feel better and are not as tight. Taking Tylenol for pain.  Works for Tablet (0.5MG)  by ORAL route 2 times every day as needed Disp:  Rfl:      No current facility-administered medications on file prior to visit. Tobacco Use: no     ROS: see HPI    Objective:   Gen: AOx3.  NAD  /80 (BP Location: Right arm, Patient P

## 2019-07-29 ENCOUNTER — TELEPHONE (OUTPATIENT)
Dept: ADMINISTRATIVE | Age: 49
End: 2019-07-29

## 2019-08-02 NOTE — TELEPHONE ENCOUNTER
Spoke with patient who started her leave started on 7/22 and was to RTW on 7/29. Patients mother passed and she did not RTW and is on bereavement until 8/6. Accommodations needed for work is a special keyboard and mouse.  SC

## 2019-08-27 ENCOUNTER — OFFICE VISIT (OUTPATIENT)
Dept: FAMILY MEDICINE CLINIC | Facility: CLINIC | Age: 49
End: 2019-08-27
Payer: COMMERCIAL

## 2019-08-27 VITALS
SYSTOLIC BLOOD PRESSURE: 103 MMHG | HEIGHT: 63.5 IN | BODY MASS INDEX: 44 KG/M2 | HEART RATE: 114 BPM | RESPIRATION RATE: 17 BRPM | DIASTOLIC BLOOD PRESSURE: 62 MMHG

## 2019-08-27 DIAGNOSIS — F51.02 ADJUSTMENT INSOMNIA: ICD-10-CM

## 2019-08-27 DIAGNOSIS — G56.02 CARPAL TUNNEL SYNDROME OF LEFT WRIST: Primary | ICD-10-CM

## 2019-08-27 DIAGNOSIS — Z63.4 BEREAVEMENT: ICD-10-CM

## 2019-08-27 DIAGNOSIS — K58.9 IRRITABLE BOWEL SYNDROME, UNSPECIFIED TYPE: ICD-10-CM

## 2019-08-27 DIAGNOSIS — M99.03 LUMBAR REGION SOMATIC DYSFUNCTION: ICD-10-CM

## 2019-08-27 DIAGNOSIS — M65.4 DE QUERVAIN'S DISEASE (RADIAL STYLOID TENOSYNOVITIS): ICD-10-CM

## 2019-08-27 DIAGNOSIS — M99.01 CERVICOTHORACIC SOMATIC DYSFUNCTION: ICD-10-CM

## 2019-08-27 DIAGNOSIS — E66.01 MORBID OBESITY WITH BMI OF 40.0-44.9, ADULT (HCC): ICD-10-CM

## 2019-08-27 DIAGNOSIS — I10 ESSENTIAL HYPERTENSION: ICD-10-CM

## 2019-08-27 PROCEDURE — 98926 OSTEOPATH MANJ 3-4 REGIONS: CPT | Performed by: FAMILY MEDICINE

## 2019-08-27 PROCEDURE — 99214 OFFICE O/P EST MOD 30 MIN: CPT | Performed by: FAMILY MEDICINE

## 2019-08-27 RX ORDER — HYOSCYAMINE SULFATE 0.125 MG
0.12 TABLET,DISINTEGRATING ORAL EVERY 4 HOURS PRN
Qty: 60 TABLET | Refills: 0 | Status: SHIPPED | OUTPATIENT
Start: 2019-08-27

## 2019-08-27 RX ORDER — LOSARTAN POTASSIUM AND HYDROCHLOROTHIAZIDE 25; 100 MG/1; MG/1
1 TABLET ORAL
Qty: 90 TABLET | Refills: 3 | Status: SHIPPED | OUTPATIENT
Start: 2019-08-27 | End: 2019-12-31

## 2019-08-27 RX ORDER — ZOLPIDEM TARTRATE 10 MG/1
10 TABLET ORAL NIGHTLY PRN
Qty: 30 TABLET | Refills: 1 | Status: SHIPPED | OUTPATIENT
Start: 2019-08-27

## 2019-08-27 RX ORDER — LORAZEPAM 0.5 MG/1
0.5 TABLET ORAL 2 TIMES DAILY PRN
Qty: 60 TABLET | Refills: 0 | Status: SHIPPED | OUTPATIENT
Start: 2019-08-27

## 2019-08-27 SDOH — SOCIAL STABILITY - SOCIAL INSECURITY: DISSAPEARANCE AND DEATH OF FAMILY MEMBER: Z63.4

## 2019-08-27 NOTE — PATIENT INSTRUCTIONS
Medication reviewed and renewed where needed and appropriate. Recommend weight loss via daily exercising and consistent healthy dietary changes. Monitor blood pressures and record at home. Limit salt intake. Comply with medications. To hand orthopedic.

## 2019-08-28 NOTE — PROGRESS NOTES
HPI:    Patient ID: Julian Whatley is a 44year old female.     Patient is a 18-year-old -American female well-known to the clinic treated for hypertension who currently denies any symptoms such as headache, chest pain, shortness of breath, dizzines Oral Tab Take 1 tablet (2.5 mg total) by mouth once daily. Disp: 90 tablet Rfl: 0   Cyclobenzaprine HCl 10 MG Oral Tab Take 1 tablet (10 mg total) by mouth 3 (three) times daily as needed for Muscle spasms.  Disp: 270 tablet Rfl: 0   Empagliflozin-Metformin tunnel syndrome of left side  Referred  - ORTHOPEDIC - EXTERNAL    2. Essential hypertension  Blood pressures to goal when measured manually by physician. - Losartan Potassium-HCTZ 100-25 MG Oral Tab; Take 1 tablet by mouth once daily.   Dispense: 90 table tablet by mouth once daily. Imaging & Referrals:  ORTHOPEDIC - EXTERNAL  Patient Instructions   Medication reviewed and renewed where needed and appropriate. Recommend weight loss via daily exercising and consistent healthy dietary changes.   Monitor

## 2019-09-05 ENCOUNTER — OFFICE VISIT (OUTPATIENT)
Dept: ORTHOPEDICS CLINIC | Facility: CLINIC | Age: 49
End: 2019-09-05
Payer: COMMERCIAL

## 2019-09-05 VITALS — BODY MASS INDEX: 39.27 KG/M2 | WEIGHT: 230 LBS | HEIGHT: 64 IN

## 2019-09-05 DIAGNOSIS — M77.8 LEFT WRIST TENDINITIS: Primary | ICD-10-CM

## 2019-09-05 PROCEDURE — 99204 OFFICE O/P NEW MOD 45 MIN: CPT | Performed by: ORTHOPAEDIC SURGERY

## 2019-09-05 NOTE — PROGRESS NOTES
NURSING INTAKE COMMENTS: Patient presents with:  Consult: C/o left wrist pain since July 2019. Recalls no injuries. Has noticed some tightness in her fingers, and a \"pulling\" feeling in her wrist.  Has been wearing a brace with some relief.   There's co LANCETS 33G Does not apply Misc Use to test blood glucose level daily Disp: 100 each Rfl: 0   ibuprofen (MOTRIN) 800 MG Oral Tab Take 1 tablet by mouth every 6 (six) hours as needed for Pain.  Disp: 270 tablet Rfl: [address] responsive, no acute distress noted  Extremities: Left wrist and hand skin intact. Mild soft tissue swelling at the intersection dorsal radial wrist.  No tenderness over first dorsal extensor compartment.   Finkelstein's maneuver positive for dorsal and ul

## 2019-09-12 ENCOUNTER — TELEPHONE (OUTPATIENT)
Dept: OCCUPATIONAL MEDICINE | Facility: HOSPITAL | Age: 49
End: 2019-09-12

## 2019-09-18 ENCOUNTER — APPOINTMENT (OUTPATIENT)
Dept: OCCUPATIONAL MEDICINE | Facility: HOSPITAL | Age: 49
End: 2019-09-18
Attending: ORTHOPAEDIC SURGERY
Payer: COMMERCIAL

## 2019-10-02 RX ORDER — LOSARTAN POTASSIUM 100 MG/1
TABLET ORAL
Qty: 90 TABLET | Refills: 0 | Status: SHIPPED | OUTPATIENT
Start: 2019-10-02 | End: 2019-12-31

## 2019-10-02 RX ORDER — HYDROCHLOROTHIAZIDE 25 MG/1
TABLET ORAL
Qty: 90 TABLET | Refills: 0 | Status: SHIPPED | OUTPATIENT
Start: 2019-10-02 | End: 2019-12-31

## 2019-10-02 NOTE — TELEPHONE ENCOUNTER
Please verify with pharmacy, see current script below:     Disp Refills Start End    Losartan Potassium-HCTZ 100-25 MG Oral Tab 90 tablet 3 8/27/2019     Sig - Route:  Take 1 tablet by mouth once daily. - Oral    Sent to pharmacy as: Losartan Potassium-HCTZ

## 2019-10-02 NOTE — TELEPHONE ENCOUNTER
Patient's last refill for Losartan-HTCZ was ordered separately due to medication being on backorder. Recent refill sent 8/27/19, pharmacy requesting refills be sent separately please advise on new scripts pended below.

## 2019-10-04 ENCOUNTER — APPOINTMENT (OUTPATIENT)
Dept: OCCUPATIONAL MEDICINE | Facility: HOSPITAL | Age: 49
End: 2019-10-04
Attending: ORTHOPAEDIC SURGERY
Payer: COMMERCIAL

## 2019-10-09 ENCOUNTER — OFFICE VISIT (OUTPATIENT)
Dept: OCCUPATIONAL MEDICINE | Facility: HOSPITAL | Age: 49
End: 2019-10-09
Attending: ORTHOPAEDIC SURGERY
Payer: COMMERCIAL

## 2019-10-09 PROCEDURE — 97110 THERAPEUTIC EXERCISES: CPT | Performed by: OCCUPATIONAL THERAPIST

## 2019-10-09 PROCEDURE — 97166 OT EVAL MOD COMPLEX 45 MIN: CPT | Performed by: OCCUPATIONAL THERAPIST

## 2019-10-09 NOTE — PROGRESS NOTES
OCCUPATIONAL THERAPY UPPER EXTREMITY EVALUATION:   Referring Physician: Dr. Sumeet Parson  Date of onset:July,2019  Diagnosis:  Left wrist tendinitis (M77.8) Date of Service: 10/09/19       PATIENT SUMMARY:   Clair Nyhan year old y/o female who presents to skilled OT to address the subcomponents of ROM, edema, strength and motor control to allow her the ability to regain above- noted skills. In agreement with FOTO score and clinical rationale this evaluation involved moderate decision making due to 3 performa OT.  Patient will demonstrate increase in leftt wrist flexion to 55 degrees for ease in opening jars. Patient will demonstrate increase in left  strength to 30 lbs for ease in carrying bags of groceries.   Patient will demonstrate decrease in left wris

## 2019-10-11 ENCOUNTER — APPOINTMENT (OUTPATIENT)
Dept: OCCUPATIONAL MEDICINE | Facility: HOSPITAL | Age: 49
End: 2019-10-11
Attending: ORTHOPAEDIC SURGERY
Payer: COMMERCIAL

## 2019-10-16 ENCOUNTER — OFFICE VISIT (OUTPATIENT)
Dept: OCCUPATIONAL MEDICINE | Facility: HOSPITAL | Age: 49
End: 2019-10-16
Attending: ORTHOPAEDIC SURGERY
Payer: COMMERCIAL

## 2019-10-16 PROCEDURE — 97140 MANUAL THERAPY 1/> REGIONS: CPT

## 2019-10-16 PROCEDURE — 97110 THERAPEUTIC EXERCISES: CPT

## 2019-10-16 NOTE — PROGRESS NOTES
Diagnosis: Left wrist tendinitis (M77.8)        Authorized # of Visits:  8         Next MD visit: 10/17/19  Fall Risk: standard         Precautions: Diabetes           Medication Changes since last visit?: No  Subjective: Patient reports compliance with HE increase in left  strength to 30 lbs for ease in carrying bags of groceries. Patient will demonstrate decrease in left wrist edema by 0.5 cm. Plan: Continue to advance as tolerated.     Charges: MTx2, TE       Total Timed Treatment: 45 min  Total Tr

## 2019-10-18 ENCOUNTER — APPOINTMENT (OUTPATIENT)
Dept: OCCUPATIONAL MEDICINE | Facility: HOSPITAL | Age: 49
End: 2019-10-18
Attending: ORTHOPAEDIC SURGERY
Payer: COMMERCIAL

## 2019-10-18 ENCOUNTER — TELEPHONE (OUTPATIENT)
Dept: OCCUPATIONAL MEDICINE | Facility: HOSPITAL | Age: 49
End: 2019-10-18

## 2019-10-21 ENCOUNTER — APPOINTMENT (OUTPATIENT)
Dept: OCCUPATIONAL MEDICINE | Facility: HOSPITAL | Age: 49
End: 2019-10-21
Attending: FAMILY MEDICINE
Payer: COMMERCIAL

## 2019-10-30 ENCOUNTER — APPOINTMENT (OUTPATIENT)
Dept: OCCUPATIONAL MEDICINE | Facility: HOSPITAL | Age: 49
End: 2019-10-30
Attending: FAMILY MEDICINE
Payer: COMMERCIAL

## 2019-11-01 ENCOUNTER — APPOINTMENT (OUTPATIENT)
Dept: OCCUPATIONAL MEDICINE | Facility: HOSPITAL | Age: 49
End: 2019-11-01
Attending: FAMILY MEDICINE
Payer: COMMERCIAL

## 2019-11-06 ENCOUNTER — OFFICE VISIT (OUTPATIENT)
Dept: OCCUPATIONAL MEDICINE | Facility: HOSPITAL | Age: 49
End: 2019-11-06
Attending: FAMILY MEDICINE
Payer: COMMERCIAL

## 2019-11-06 PROCEDURE — 97110 THERAPEUTIC EXERCISES: CPT | Performed by: OCCUPATIONAL THERAPIST

## 2019-11-06 PROCEDURE — 97140 MANUAL THERAPY 1/> REGIONS: CPT | Performed by: OCCUPATIONAL THERAPIST

## 2019-11-06 NOTE — PROGRESS NOTES
Diagnosis: Left wrist tendinitis (M77.8)        Authorized # of Visits:  8         Next MD visit: 10/17/19  Fall Risk: standard         Precautions: Diabetes           Medication Changes since last visit?: No  Subjective: \"I think the tape helps a lot,  w

## 2019-11-08 ENCOUNTER — OFFICE VISIT (OUTPATIENT)
Dept: OCCUPATIONAL MEDICINE | Facility: HOSPITAL | Age: 49
End: 2019-11-08
Attending: FAMILY MEDICINE
Payer: COMMERCIAL

## 2019-11-08 PROCEDURE — 97035 APP MDLTY 1+ULTRASOUND EA 15: CPT | Performed by: OCCUPATIONAL THERAPIST

## 2019-11-08 PROCEDURE — 97110 THERAPEUTIC EXERCISES: CPT | Performed by: OCCUPATIONAL THERAPIST

## 2019-11-08 PROCEDURE — 97140 MANUAL THERAPY 1/> REGIONS: CPT | Performed by: OCCUPATIONAL THERAPIST

## 2019-11-08 NOTE — PROGRESS NOTES
Diagnosis: Left wrist tendinitis (M77.8)        Authorized # of Visits:  8         Next MD visit: 10/17/19  Fall Risk: standard         Precautions: Diabetes           Medication Changes since last visit?: No  Subjective: \"Last night I had some pain radia jars...(Achieved)  Patient will demonstrate increase in left  strength to 30 lbs for ease in carrying bags of groceries. Patient will demonstrate decrease in left wrist edema by 0.5 cm. ..(made progress toward)    Plan: Continue with working toward [employer]

## 2019-11-12 ENCOUNTER — APPOINTMENT (OUTPATIENT)
Dept: OCCUPATIONAL MEDICINE | Facility: HOSPITAL | Age: 49
End: 2019-11-12
Attending: FAMILY MEDICINE
Payer: COMMERCIAL

## 2019-11-15 ENCOUNTER — OFFICE VISIT (OUTPATIENT)
Dept: OCCUPATIONAL MEDICINE | Facility: HOSPITAL | Age: 49
End: 2019-11-15
Attending: FAMILY MEDICINE
Payer: COMMERCIAL

## 2019-11-15 PROCEDURE — 97140 MANUAL THERAPY 1/> REGIONS: CPT | Performed by: OCCUPATIONAL THERAPIST

## 2019-11-15 PROCEDURE — 97110 THERAPEUTIC EXERCISES: CPT | Performed by: OCCUPATIONAL THERAPIST

## 2019-11-15 NOTE — PROGRESS NOTES
Diagnosis: Left wrist tendinitis (M77.8)        Authorized # of Visits:  8         Next MD visit: 10/17/19  Fall Risk: standard         Precautions: Diabetes           Medication Changes since last visit?: No  Subjective: \"I really haven't had much pain a increase in leftt wrist flexion to 55 degrees for ease in opening jars. Marilin Marr .(Achieved)  Patient will demonstrate increase in left  strength to 30 lbs for ease in carrying bags of groceries. Patient will demonstrate decrease in left wrist edema by 0.5 cm.

## 2019-11-18 ENCOUNTER — APPOINTMENT (OUTPATIENT)
Dept: OCCUPATIONAL MEDICINE | Facility: HOSPITAL | Age: 49
End: 2019-11-18
Attending: FAMILY MEDICINE
Payer: COMMERCIAL

## 2019-12-04 ENCOUNTER — TELEPHONE (OUTPATIENT)
Dept: OCCUPATIONAL MEDICINE | Facility: HOSPITAL | Age: 49
End: 2019-12-04

## 2019-12-04 ENCOUNTER — APPOINTMENT (OUTPATIENT)
Dept: OCCUPATIONAL MEDICINE | Facility: HOSPITAL | Age: 49
End: 2019-12-04
Attending: FAMILY MEDICINE
Payer: COMMERCIAL

## 2019-12-30 DIAGNOSIS — I10 ESSENTIAL HYPERTENSION: ICD-10-CM

## 2019-12-31 RX ORDER — LOSARTAN POTASSIUM 100 MG/1
TABLET ORAL
Qty: 90 TABLET | Refills: 0 | Status: SHIPPED | OUTPATIENT
Start: 2019-12-31 | End: 2020-04-01

## 2019-12-31 RX ORDER — LOSARTAN POTASSIUM AND HYDROCHLOROTHIAZIDE 25; 100 MG/1; MG/1
1 TABLET ORAL
Qty: 90 TABLET | Refills: 1 | Status: SHIPPED | OUTPATIENT
Start: 2019-12-31 | End: 2020-07-17

## 2019-12-31 RX ORDER — AMLODIPINE BESYLATE 2.5 MG/1
TABLET ORAL
Qty: 90 TABLET | Refills: 1 | Status: SHIPPED | OUTPATIENT
Start: 2019-12-31 | End: 2020-07-06

## 2019-12-31 RX ORDER — HYDROCHLOROTHIAZIDE 25 MG/1
TABLET ORAL
Qty: 90 TABLET | Refills: 1 | Status: SHIPPED | OUTPATIENT
Start: 2019-12-31 | End: 2020-07-06

## 2019-12-31 NOTE — TELEPHONE ENCOUNTER
RF would you like to pt to take this as a combination as previously sent 8/27/19? It was changed to separate orders 10/2/19 d/t medication being on back order. RN pended the combination option    Refill passed per [facility], [facility] protocol.   Olivia MARI

## 2020-02-20 ENCOUNTER — OFFICE VISIT (OUTPATIENT)
Dept: FAMILY MEDICINE CLINIC | Facility: CLINIC | Age: 50
End: 2020-02-20
Payer: COMMERCIAL

## 2020-02-20 VITALS
BODY MASS INDEX: 44.39 KG/M2 | HEIGHT: 64 IN | WEIGHT: 260 LBS | HEART RATE: 89 BPM | RESPIRATION RATE: 18 BRPM | DIASTOLIC BLOOD PRESSURE: 81 MMHG | SYSTOLIC BLOOD PRESSURE: 115 MMHG

## 2020-02-20 DIAGNOSIS — I10 ESSENTIAL HYPERTENSION: ICD-10-CM

## 2020-02-20 DIAGNOSIS — G56.22 ULNAR NEUROPATHY OF LEFT UPPER EXTREMITY: ICD-10-CM

## 2020-02-20 DIAGNOSIS — M77.8 LEFT WRIST TENDINITIS: Primary | ICD-10-CM

## 2020-02-20 PROCEDURE — 99214 OFFICE O/P EST MOD 30 MIN: CPT | Performed by: FAMILY MEDICINE

## 2020-02-20 RX ORDER — EMPAGLIFLOZIN, METFORMIN HYDROCHLORIDE 12.5; 1 MG/1; MG/1
TABLET, EXTENDED RELEASE ORAL
COMMUNITY
Start: 2020-02-07

## 2020-02-20 NOTE — PATIENT INSTRUCTIONS
Patient has been referred to plastics, Dr. Cece Castaneda for second opinion. Carpal splint to be worn as needed. Anti-inflammatory medication recommended on a consistent basis. Medication reviewed and renewed where needed and appropriate.   Comply with med Writer checked in with pt 1:1 due to current mood and comments made during group check-in.  Pt expressed struggling with intense anger, fearing that she could no longer control it and would end up hurting someone if they made her upset. Pt could not identify ways to control her anger, and expressed recognizing that she didn't want to act on these thoughts but because her anger was \"higher than it's ever been\" she would not be able to control herself.      Pt and writer discussed problem solving in relation to who could  her children and getting belongings brought to the hospital is these were initially barriers pt listed to having as assessment done today.  Eventually, pt agreed to make phone calls in relation to this, which she did in front of writer.  Writer also briefly spoke with pt's mother at pt's request to inform her of current situation and that a higher level of care is being recommended.    Pt agreed to an assessment, and writer escorted pt to intake department with a recommendation for possible inpatient care.

## 2020-02-20 NOTE — PROGRESS NOTES
HPI:    Patient ID: Gladys Fox is a 44year old female.     This is a 57-year-old -American female well established at our clinic who returns today still symptomatic with what has been diagnosed as left wrist tendinitis/carpal tunnel syndrome of Dispersible Place 1 tablet (0.125 mg total) under the tongue every 4 (four) hours as needed. 60 tablet 0   • Empagliflozin-Metformin HCl ER (SYNJARDY XR)  MG Oral Tablet 24 Hr Take 1 tablet by mouth daily.      • Glucose Blood (ONETOUCH VERIO) In Vit activity daily. Recommend weight loss via daily exercising and consistent healthy dietary changes. Return in about 6 weeks (around 4/2/2020), or if symptoms worsen or fail to improve.          SYDNI#[id number]

## 2020-03-03 ENCOUNTER — NURSE TRIAGE (OUTPATIENT)
Dept: FAMILY MEDICINE CLINIC | Facility: CLINIC | Age: 50
End: 2020-03-03

## 2020-03-03 ENCOUNTER — PATIENT MESSAGE (OUTPATIENT)
Dept: FAMILY MEDICINE CLINIC | Facility: CLINIC | Age: 50
End: 2020-03-03

## 2020-03-03 DIAGNOSIS — N89.8 VAGINAL DISCHARGE: Primary | ICD-10-CM

## 2020-03-04 RX ORDER — FLUCONAZOLE 150 MG/1
150 TABLET ORAL ONCE
Qty: 1 TABLET | Refills: 0 | Status: SHIPPED | OUTPATIENT
Start: 2020-03-04 | End: 2020-03-04

## 2020-03-04 NOTE — TELEPHONE ENCOUNTER
Diflucan tablet sent to the pharmacy for treatment of yeast.  Please call the patient and let her know.

## 2020-03-04 NOTE — TELEPHONE ENCOUNTER
From: Karlee Hollis  To: Geraldine Davis DO  Sent: 3/3/2020 7:56 PM CST  Subject: Other    Good Evening Dr. Bernadette Everett, I hope you're feeling better.  Can you pls send a script for diflucan & yeast infection cream.    thank you in advance & have a gd w

## 2020-03-04 NOTE — TELEPHONE ENCOUNTER
Adriana Hernandez Forrest J, DO 8 minutes ago (7:56 PM)        Good Evening Dr. Oliver Show, I hope you're feeling better.  Can you pls send a script for diflucan & yeast infection cream.     thank you in advance & have a gd week

## 2020-03-04 NOTE — TELEPHONE ENCOUNTER
Action Requested: Summary for Provider     []  Critical Lab, Recommendations Needed  [] Need Additional Advice  []   FYI    []   Need Orders  [] Need Medications Sent to Pharmacy  []  Other     SUMMARY: Pt requesting RX, c/c vaginal itching ,white discharg

## 2020-03-05 ENCOUNTER — OFFICE VISIT (OUTPATIENT)
Dept: SURGERY | Facility: CLINIC | Age: 50
End: 2020-03-05
Payer: COMMERCIAL

## 2020-03-05 DIAGNOSIS — M18.12 PRIMARY OSTEOARTHRITIS OF FIRST CARPOMETACARPAL JOINT OF LEFT HAND: ICD-10-CM

## 2020-03-05 DIAGNOSIS — G56.22 ULNAR NERVE ENTRAPMENT AT ELBOW, LEFT: ICD-10-CM

## 2020-03-05 DIAGNOSIS — M79.642 PAIN OF LEFT HAND: Primary | ICD-10-CM

## 2020-03-05 PROCEDURE — 99243 OFF/OP CNSLTJ NEW/EST LOW 30: CPT | Performed by: PLASTIC SURGERY

## 2020-03-05 PROCEDURE — L3999 UPPER LIMB ORTHOSIS NOS: HCPCS | Performed by: PLASTIC SURGERY

## 2020-03-05 NOTE — PROGRESS NOTES
Per Dr. Aquilino Diaz' verbal orders, applied small left tate splint. Patient verbalized fits comfortably.

## 2020-03-05 NOTE — H&P
Mahamed Lutz is a 44year old female that presents with Patient presents with:  Carpal Tunnel Syndrome: Bilateral hand   .     REFERRED BY: Esperanza Ramey MD      Pacemaker: No  Latex Allergy: no  Coumadin: No  Plavix: No  Other anticoagulants: No  Ca treatments and occupational therap,has been effective       PMH:     MEDICAL  Past Medical History:   Diagnosis Date   • Anxiety state, unspecified    • Diabetes ([facility] [address]    • High blood pressure    • Unspecified essential hypertension         SURGICAL  Past Smoking status: Never Smoker      Smokeless tobacco: Never Used    Alcohol use: Yes      Comment: beer & liquor, 2 glasses occasionally    Drug use: No       FAMILY HISTORY  Family History   Problem Relation Age of Onset   • Hypertension Father    • Diabet is not always possible. Arthritis may require multiple treatments over a long period of time, and symptoms may not go away completely. Surgery is sometimes necessary. Questions were answered and the patient wishes to proceed with treatment.     Splint - I

## 2020-03-05 NOTE — TELEPHONE ENCOUNTER
The patient was called and did not . I called the pharmacy who informed me. Dr. Svetlana Medina Is on hold because she requested Diflucan from 2 other physicians.    She did not  any of the Diflucan  FYI

## 2020-03-06 ENCOUNTER — TELEPHONE (OUTPATIENT)
Dept: FAMILY MEDICINE CLINIC | Facility: CLINIC | Age: 50
End: 2020-03-06

## 2020-03-06 NOTE — TELEPHONE ENCOUNTER
Returned pt's call, LMTCB. FMLA forms received in Forms Dept. Sent MyChart message for completion of Hip[employer]/FCR.

## 2020-03-09 NOTE — TELEPHONE ENCOUNTER
Dr. Katja Sanchez,    Please sign off on form: Re Lilibeth Hu previously signed by Dr. Christi Willams  1-4 flare ups per month, 1-2 appts per month due to carpal tunnel    -Highlight the patient and hit \"Chart\" button.   -In Chart Review, w/in the Encounter tab - click

## 2020-04-01 RX ORDER — LOSARTAN POTASSIUM 100 MG/1
TABLET ORAL
Qty: 90 TABLET | Refills: 0 | Status: SHIPPED | OUTPATIENT
Start: 2020-04-01 | End: 2020-07-17

## 2020-05-12 ENCOUNTER — VIRTUAL PHONE E/M (OUTPATIENT)
Dept: FAMILY MEDICINE CLINIC | Facility: CLINIC | Age: 50
End: 2020-05-12
Payer: COMMERCIAL

## 2020-05-12 DIAGNOSIS — J30.9 ALLERGIC RHINITIS, UNSPECIFIED SEASONALITY, UNSPECIFIED TRIGGER: ICD-10-CM

## 2020-05-12 DIAGNOSIS — E11.9 TYPE 2 DIABETES MELLITUS WITHOUT COMPLICATION, WITHOUT LONG-TERM CURRENT USE OF INSULIN (HCC): ICD-10-CM

## 2020-05-12 DIAGNOSIS — R09.81 NASAL SINUS CONGESTION: Primary | ICD-10-CM

## 2020-05-12 PROCEDURE — 99213 OFFICE O/P EST LOW 20 MIN: CPT | Performed by: FAMILY MEDICINE

## 2020-05-12 RX ORDER — FLUTICASONE PROPIONATE 50 MCG
2 SPRAY, SUSPENSION (ML) NASAL DAILY
Qty: 3 BOTTLE | Refills: 3 | Status: SHIPPED | OUTPATIENT
Start: 2020-05-12 | End: 2021-05-13

## 2020-05-12 NOTE — PROGRESS NOTES
Virtual Telephone Check-In    Yessenia Byrd verbally consents to a Virtual/Telephone Check-In visit on 05/12/20. Patient understands and accepts financial responsibility for any deductible, co-insurance and/or co-pays associated with this service. pharmacy. Patient to continue with her Benadryl for now but if it does not bring about symptom resolution we will give a trial of prescription Xyzal.  Weight loss via consistent healthier dietary choices recommended.   Exercise should be at least 120 minut

## 2020-05-12 NOTE — PATIENT INSTRUCTIONS
Fluticasone nasal spray prescribed and sent to the pharmacy.   Patient to continue with her Benadryl for now but if it does not bring about symptom resolution we will give a trial of prescription Xyzal.  Weight loss via consistent healthier dietary choices

## 2020-07-05 DIAGNOSIS — I10 ESSENTIAL HYPERTENSION: ICD-10-CM

## 2020-07-06 RX ORDER — HYDROCHLOROTHIAZIDE 25 MG/1
TABLET ORAL
Qty: 90 TABLET | Refills: 1 | Status: SHIPPED | OUTPATIENT
Start: 2020-07-06 | End: 2020-10-22

## 2020-07-06 RX ORDER — AMLODIPINE BESYLATE 2.5 MG/1
TABLET ORAL
Qty: 90 TABLET | Refills: 1 | Status: SHIPPED | OUTPATIENT
Start: 2020-07-06 | End: 2020-10-22

## 2020-07-17 DIAGNOSIS — I10 ESSENTIAL HYPERTENSION: ICD-10-CM

## 2020-07-17 RX ORDER — LOSARTAN POTASSIUM 100 MG/1
100 TABLET ORAL DAILY
Qty: 90 TABLET | Refills: 0 | Status: SHIPPED | OUTPATIENT
Start: 2020-07-17 | End: 2020-10-10

## 2020-07-17 RX ORDER — LOSARTAN POTASSIUM AND HYDROCHLOROTHIAZIDE 25; 100 MG/1; MG/1
1 TABLET ORAL
Qty: 90 TABLET | Refills: 1 | Status: SHIPPED | OUTPATIENT
Start: 2020-07-17 | End: 2021-08-09

## 2020-08-24 ENCOUNTER — OFFICE VISIT (OUTPATIENT)
Dept: FAMILY MEDICINE CLINIC | Facility: CLINIC | Age: 50
End: 2020-08-24
Payer: COMMERCIAL

## 2020-08-24 VITALS
DIASTOLIC BLOOD PRESSURE: 83 MMHG | SYSTOLIC BLOOD PRESSURE: 118 MMHG | WEIGHT: 251 LBS | BODY MASS INDEX: 42.85 KG/M2 | RESPIRATION RATE: 17 BRPM | HEIGHT: 64 IN | TEMPERATURE: 97 F | HEART RATE: 87 BPM

## 2020-08-24 DIAGNOSIS — H69.81 DYSFUNCTION OF RIGHT EUSTACHIAN TUBE: ICD-10-CM

## 2020-08-24 DIAGNOSIS — Z20.5 EXPOSURE TO HEPATITIS C: ICD-10-CM

## 2020-08-24 DIAGNOSIS — I10 ESSENTIAL HYPERTENSION: ICD-10-CM

## 2020-08-24 DIAGNOSIS — E66.01 MORBID OBESITY WITH BMI OF 40.0-44.9, ADULT (HCC): ICD-10-CM

## 2020-08-24 DIAGNOSIS — G56.22 ENTRAPMENT OF LEFT ULNAR NERVE: ICD-10-CM

## 2020-08-24 DIAGNOSIS — E11.9 TYPE 2 DIABETES MELLITUS WITHOUT COMPLICATION, WITHOUT LONG-TERM CURRENT USE OF INSULIN (HCC): Primary | ICD-10-CM

## 2020-08-24 LAB
ALBUMIN SERPL-MCNC: 3.5 G/DL (ref 3.4–5)
ALBUMIN/GLOB SERPL: 0.7 {RATIO} (ref 1–2)
ALP LIVER SERPL-CCNC: 71 U/L (ref 41–108)
ALT SERPL-CCNC: 18 U/L (ref 13–56)
ANION GAP SERPL CALC-SCNC: 9 MMOL/L (ref 0–18)
AST SERPL-CCNC: 17 U/L (ref 15–37)
BILIRUB SERPL-MCNC: 0.5 MG/DL (ref 0.1–2)
BUN BLD-MCNC: 10 MG/DL (ref 7–18)
BUN/CREAT SERPL: 12.5 (ref 10–20)
CALCIUM BLD-MCNC: 9.6 MG/DL (ref 8.5–10.1)
CHLORIDE SERPL-SCNC: 103 MMOL/L (ref 98–112)
CHOLEST SMN-MCNC: 185 MG/DL (ref ?–200)
CO2 SERPL-SCNC: 24 MMOL/L (ref 21–32)
CREAT BLD-MCNC: 0.8 MG/DL (ref 0.55–1.02)
EST. AVERAGE GLUCOSE BLD GHB EST-MCNC: 154 MG/DL (ref 68–126)
GLOBULIN PLAS-MCNC: 5.3 G/DL (ref 2.8–4.4)
GLUCOSE BLD-MCNC: 74 MG/DL (ref 70–99)
HAV AB SER QL IA: NONREACTIVE
HBA1C MFR BLD HPLC: 7 % (ref ?–5.7)
HBV CORE AB SERPL QL IA: NONREACTIVE
HBV SURFACE AB SER QL: NONREACTIVE
HBV SURFACE AB SERPL IA-ACNC: <3.1 MIU/ML
HBV SURFACE AG SERPL QL IA: NONREACTIVE
HCV AB SERPL QL IA: NONREACTIVE
HDLC SERPL-MCNC: 40 MG/DL (ref 40–59)
LDLC SERPL CALC-MCNC: 131 MG/DL (ref ?–100)
M PROTEIN MFR SERPL ELPH: 8.8 G/DL (ref 6.4–8.2)
NONHDLC SERPL-MCNC: 145 MG/DL (ref ?–130)
OSMOLALITY SERPL CALC.SUM OF ELEC: 280 MOSM/KG (ref 275–295)
PATIENT FASTING Y/N/NP: YES
PATIENT FASTING Y/N/NP: YES
POTASSIUM SERPL-SCNC: 3.7 MMOL/L (ref 3.5–5.1)
PROT UR-MCNC: 14.6 MG/DL
SODIUM SERPL-SCNC: 136 MMOL/L (ref 136–145)
TRIGL SERPL-MCNC: 72 MG/DL (ref 30–149)
VLDLC SERPL CALC-MCNC: 14 MG/DL (ref 0–30)

## 2020-08-24 PROCEDURE — 3008F BODY MASS INDEX DOCD: CPT | Performed by: FAMILY MEDICINE

## 2020-08-24 PROCEDURE — 99214 OFFICE O/P EST MOD 30 MIN: CPT | Performed by: FAMILY MEDICINE

## 2020-08-24 PROCEDURE — 3074F SYST BP LT 130 MM HG: CPT | Performed by: FAMILY MEDICINE

## 2020-08-24 PROCEDURE — 3079F DIAST BP 80-89 MM HG: CPT | Performed by: FAMILY MEDICINE

## 2020-08-24 NOTE — PATIENT INSTRUCTIONS
Medication reviewed and renewed where needed and appropriate. Comply with medications. Recommend weight loss via daily exercising and consistent healthy dietary changes. Monitor blood pressures and record at home. Limit salt intake.   Encouraged physical

## 2020-08-24 NOTE — PROGRESS NOTES
HPI:    Patient ID: Andrea Moffett is a 44year old female. This patient is a 75-year-old -American female who is well-established at our clinic who presents today for follow-up on her specialist visit regarding the left ulnar nerve entrapment. • hyoscyamine sulfate 0.125 MG Oral Tablet Dispersible Place 1 tablet (0.125 mg total) under the tongue every 4 (four) hours as needed.  60 tablet 0   • Cyclobenzaprine HCl 10 MG Oral Tab Take 1 tablet (10 mg total) by mouth 3 (three) times daily as needed Recommend weight loss via daily exercising and consistent healthy dietary changes. 4. Dysfunction of right eustachian tube  Patient has been encouraged to use her nasal steroid inhaler. 5. Exposure to hepatitis C  Screened.   - HEPATITIS A B + C PROFI

## 2020-09-10 ENCOUNTER — TELEPHONE (OUTPATIENT)
Dept: ADMINISTRATIVE | Age: 50
End: 2020-09-10

## 2020-09-22 NOTE — TELEPHONE ENCOUNTER
Pt lvm checking status on form. Someone has started her form and is in the process of being completed. Pt wanted to know how to pay the $25.00.

## 2020-09-24 NOTE — TELEPHONE ENCOUNTER
Dr. Clarissa Lafleur,     Please sign off on form:  -Highlight the patient and hit \"Chart\" button.   -In Chart Review, w/in the Encounter tab - click 1 time on the Telephone call encounter for 9/10/20 Scroll down the telephone encounter.  -Click \"scan on\" blue

## 2020-10-06 ENCOUNTER — PATIENT MESSAGE (OUTPATIENT)
Dept: FAMILY MEDICINE CLINIC | Facility: CLINIC | Age: 50
End: 2020-10-06

## 2020-10-06 DIAGNOSIS — I10 ESSENTIAL HYPERTENSION: ICD-10-CM

## 2020-10-06 DIAGNOSIS — M25.539 PAIN IN WRIST, UNSPECIFIED LATERALITY: Primary | ICD-10-CM

## 2020-10-06 RX ORDER — IBUPROFEN 600 MG/1
600 TABLET ORAL EVERY 8 HOURS
Qty: 30 TABLET | Refills: 0 | Status: SHIPPED | OUTPATIENT
Start: 2020-10-06 | End: 2021-04-06

## 2020-10-06 NOTE — TELEPHONE ENCOUNTER
From: Karlee Hollis  To: Geraldine Davis DO  Sent: 10/6/2020 7:25 AM CDT  Subject: Other    gd morning Dr. Bernadette Everett,    My hand/wrist has been swollen since 10/2.  Can you pls send a prescription for pain & something that will help reduce the swellin

## 2020-10-10 RX ORDER — LOSARTAN POTASSIUM 100 MG/1
TABLET ORAL
Qty: 90 TABLET | Refills: 0 | Status: SHIPPED | OUTPATIENT
Start: 2020-10-10 | End: 2021-02-04

## 2020-10-23 RX ORDER — AMLODIPINE BESYLATE 2.5 MG/1
2.5 TABLET ORAL DAILY
Qty: 90 TABLET | Refills: 1 | Status: SHIPPED | OUTPATIENT
Start: 2020-10-23 | End: 2021-05-03

## 2020-10-23 RX ORDER — HYDROCHLOROTHIAZIDE 25 MG/1
25 TABLET ORAL DAILY
Qty: 90 TABLET | Refills: 1 | Status: SHIPPED | OUTPATIENT
Start: 2020-10-23 | End: 2021-05-03

## 2020-10-23 NOTE — TELEPHONE ENCOUNTER
Routed to Dr Christian Ji for advise, thanks. No future appointments. Requested Prescriptions     Pending Prescriptions Disp Refills   • amLODIPine Besylate 2.5 MG Oral Tab 90 tablet 1     Sig: Take 1 tablet (2.5 mg total) by mouth daily.    • hydrochlo

## 2020-12-03 NOTE — TELEPHONE ENCOUNTER
Dr. Eh Graves,    Patient has intermittent FMLA good through 9/9/20 - 3/9/21 for 1-4 episodes per month for flare ups and 1-2 treatments a year for appts only.  Patient has exceeded the perimeters of treatments because in addition to her 1-4 days, she states

## 2020-12-07 NOTE — TELEPHONE ENCOUNTER
Dr. Millicent Rodriguez,    Attached is the patients  FMLA with new perimeters you approved. Please sign off on form:  -Highlight the patient and hit \"Chart\" button.   -In Chart Review, w/in the Encounter tab - click 1 time on the Telephone call encounter for 9

## 2021-02-04 RX ORDER — LOSARTAN POTASSIUM 100 MG/1
TABLET ORAL
Qty: 90 TABLET | Refills: 0 | Status: SHIPPED | OUTPATIENT
Start: 2021-02-04 | End: 2021-04-30

## 2021-03-18 DIAGNOSIS — Z23 NEED FOR VACCINATION: ICD-10-CM

## 2021-04-06 DIAGNOSIS — M25.539 PAIN IN WRIST, UNSPECIFIED LATERALITY: ICD-10-CM

## 2021-04-06 RX ORDER — IBUPROFEN 600 MG/1
600 TABLET ORAL EVERY 8 HOURS
Qty: 30 TABLET | Refills: 0 | Status: SHIPPED | OUTPATIENT
Start: 2021-04-06 | End: 2021-05-18

## 2021-04-30 RX ORDER — LOSARTAN POTASSIUM 100 MG/1
TABLET ORAL
Qty: 90 TABLET | Refills: 0 | Status: SHIPPED | OUTPATIENT
Start: 2021-04-30 | End: 2021-08-04

## 2021-05-03 DIAGNOSIS — I10 ESSENTIAL HYPERTENSION: ICD-10-CM

## 2021-05-03 RX ORDER — HYDROCHLOROTHIAZIDE 25 MG/1
TABLET ORAL
Qty: 90 TABLET | Refills: 1 | Status: SHIPPED | OUTPATIENT
Start: 2021-05-03 | End: 2021-08-09

## 2021-05-03 RX ORDER — AMLODIPINE BESYLATE 2.5 MG/1
TABLET ORAL
Qty: 90 TABLET | Refills: 1 | Status: SHIPPED | OUTPATIENT
Start: 2021-05-03 | End: 2021-08-09

## 2021-05-13 DIAGNOSIS — J30.9 ALLERGIC RHINITIS, UNSPECIFIED SEASONALITY, UNSPECIFIED TRIGGER: ICD-10-CM

## 2021-05-13 DIAGNOSIS — R09.81 NASAL SINUS CONGESTION: ICD-10-CM

## 2021-05-13 RX ORDER — FLUTICASONE PROPIONATE 50 MCG
2 SPRAY, SUSPENSION (ML) NASAL DAILY
Qty: 3 INHALER | Refills: 3 | Status: SHIPPED | OUTPATIENT
Start: 2021-05-13

## 2021-05-18 ENCOUNTER — OFFICE VISIT (OUTPATIENT)
Dept: FAMILY MEDICINE CLINIC | Facility: CLINIC | Age: 51
End: 2021-05-18
Payer: COMMERCIAL

## 2021-05-18 VITALS
HEART RATE: 86 BPM | HEIGHT: 64 IN | SYSTOLIC BLOOD PRESSURE: 138 MMHG | WEIGHT: 250 LBS | RESPIRATION RATE: 17 BRPM | DIASTOLIC BLOOD PRESSURE: 90 MMHG | BODY MASS INDEX: 42.68 KG/M2

## 2021-05-18 DIAGNOSIS — Z12.31 ENCOUNTER FOR SCREENING MAMMOGRAM FOR BREAST CANCER: Primary | ICD-10-CM

## 2021-05-18 DIAGNOSIS — H61.21 IMPACTED CERUMEN OF RIGHT EAR: ICD-10-CM

## 2021-05-18 DIAGNOSIS — E66.01 MORBID OBESITY WITH BMI OF 40.0-44.9, ADULT (HCC): ICD-10-CM

## 2021-05-18 DIAGNOSIS — E11.9 TYPE 2 DIABETES MELLITUS WITHOUT COMPLICATION, WITHOUT LONG-TERM CURRENT USE OF INSULIN (HCC): ICD-10-CM

## 2021-05-18 DIAGNOSIS — M79.602 LEFT ARM PAIN: ICD-10-CM

## 2021-05-18 DIAGNOSIS — I10 ESSENTIAL HYPERTENSION: ICD-10-CM

## 2021-05-18 DIAGNOSIS — M25.539 PAIN IN WRIST, UNSPECIFIED LATERALITY: ICD-10-CM

## 2021-05-18 PROCEDURE — 3075F SYST BP GE 130 - 139MM HG: CPT | Performed by: FAMILY MEDICINE

## 2021-05-18 PROCEDURE — 69209 REMOVE IMPACTED EAR WAX UNI: CPT | Performed by: FAMILY MEDICINE

## 2021-05-18 PROCEDURE — 3008F BODY MASS INDEX DOCD: CPT | Performed by: FAMILY MEDICINE

## 2021-05-18 PROCEDURE — 99214 OFFICE O/P EST MOD 30 MIN: CPT | Performed by: FAMILY MEDICINE

## 2021-05-18 PROCEDURE — 3080F DIAST BP >= 90 MM HG: CPT | Performed by: FAMILY MEDICINE

## 2021-05-18 PROCEDURE — 83036 HEMOGLOBIN GLYCOSYLATED A1C: CPT | Performed by: FAMILY MEDICINE

## 2021-05-18 RX ORDER — IBUPROFEN 600 MG/1
600 TABLET ORAL EVERY 8 HOURS
Qty: 30 TABLET | Refills: 0 | Status: SHIPPED | OUTPATIENT
Start: 2021-05-18 | End: 2021-11-04

## 2021-05-18 NOTE — PROCEDURES
EAC(s) irrigated manually to clear with h2o/h2o2 10:1 solution with syringe. Patient tolerated procedure without any complication. Hearing completely restored AD.  Daily ear canal recommended (no deep swabbing into canals or any instrumentation deep into th

## 2021-05-20 PROBLEM — M75.02 ADHESIVE CAPSULITIS OF LEFT SHOULDER: Status: ACTIVE | Noted: 2021-05-20

## 2021-05-22 PROBLEM — R20.2 NUMBNESS AND TINGLING IN LEFT HAND: Status: ACTIVE | Noted: 2021-05-22

## 2021-05-22 PROBLEM — R20.0 NUMBNESS AND TINGLING IN LEFT HAND: Status: ACTIVE | Noted: 2021-05-22

## 2021-06-03 ENCOUNTER — TELEPHONE (OUTPATIENT)
Dept: FAMILY MEDICINE CLINIC | Facility: CLINIC | Age: 51
End: 2021-06-03

## 2021-06-03 NOTE — TELEPHONE ENCOUNTER
Pt lvm checking if we received forms. Called pt and provided her with new email address. Pt would like a call and leave a vm when we receive her forms. States all information is the same as previous fmla.

## 2021-06-09 ENCOUNTER — MED REC SCAN ONLY (OUTPATIENT)
Dept: ADMINISTRATIVE | Age: 51
End: 2021-06-09

## 2021-06-10 NOTE — TELEPHONE ENCOUNTER
Dr. Nancy Reza,     Please sign off on form: Re certification FMLA, same information as previous  1-6 flare ups per month  -Highlight the patient and hit \"Chart\" button. -In Chart Review, w/in the Encounter tab - click 1 time on the Telephone call encounter for 6/3/21 Scroll down the telephone encounter.  -Click \"scan on\" blue Hyperlink under \"Media\" heading for FMLA Dr Nancy Reza 6/10/21  w/in the telephone enc.  -Click on Acknowledge button at the bottom right corner and left-click onto image, signature stamp appears and drag signature to Provider signature line. Stamp will turn blue. Close window.      Thank you,    Dina Solorio

## 2021-06-11 NOTE — TELEPHONE ENCOUNTER
Returned patients call for confirmation of messages. Told patient all rec'd, form was completed and will fax.

## 2021-08-04 RX ORDER — LOSARTAN POTASSIUM 100 MG/1
100 TABLET ORAL DAILY
Qty: 90 TABLET | Refills: 1 | Status: SHIPPED | OUTPATIENT
Start: 2021-08-04 | End: 2021-08-09

## 2021-08-04 NOTE — TELEPHONE ENCOUNTER
Refill passed per [employer], [facility] protocol.      Requested Prescriptions   Pending Prescriptions Disp Refills    LOSARTAN 100 MG Oral Tab [Pharmacy Med Name: LOSARTAN POTASSIUM 100 MG TAB] 90 tablet 0     Sig: TAKE 1 TABLET BY MOUTH EVERY DAY        Hypertensive Medications Protocol Passed - 8/4/2021 12:05 AM        Passed - CMP or BMP in past 12 months        Passed - Appointment in past 6 or next 3 months        Passed - GFR  > 50     Lab Results   Component Value Date    GFRAA 98 08/24/2020                     [unfilled]      @[facility]@

## 2021-08-04 NOTE — TELEPHONE ENCOUNTER
Refill passed per [place] [facility], [facility] protocol. Requested Prescriptions   Pending Prescriptions Disp Refills    losartan 100 MG Oral Tab [Pharmacy Med Name: LOSARTAN POTASSIUM 100 MG TAB] 90 tablet 1     Sig: Take 1 tablet (100 mg total) by mouth daily.         Hypertensive Medications Protocol Passed - 8/4/2021 12:05 AM        Passed - CMP or BMP in past 12 months        Passed - Appointment in past 6 or next 3 months        Passed - GFR  > 50     Lab Results   Component Value Date    GFRAA 98 08/24/2020                     [unfilled]      @[facility] Center[facility]@

## 2021-08-09 DIAGNOSIS — I10 ESSENTIAL HYPERTENSION: ICD-10-CM

## 2021-08-09 RX ORDER — HYDROCHLOROTHIAZIDE 25 MG/1
25 TABLET ORAL DAILY
Qty: 90 TABLET | Refills: 1 | Status: SHIPPED | OUTPATIENT
Start: 2021-08-09

## 2021-08-09 RX ORDER — LOSARTAN POTASSIUM 100 MG/1
100 TABLET ORAL DAILY
Qty: 90 TABLET | Refills: 1 | Status: SHIPPED | OUTPATIENT
Start: 2021-08-09

## 2021-08-09 RX ORDER — AMLODIPINE BESYLATE 2.5 MG/1
2.5 TABLET ORAL DAILY
Qty: 90 TABLET | Refills: 1 | Status: SHIPPED | OUTPATIENT
Start: 2021-08-09 | End: 2021-11-04

## 2021-08-09 RX ORDER — LOSARTAN POTASSIUM AND HYDROCHLOROTHIAZIDE 25; 100 MG/1; MG/1
1 TABLET ORAL
Qty: 90 TABLET | Refills: 1 | Status: SHIPPED | OUTPATIENT
Start: 2021-08-09 | End: 2022-01-28

## 2021-09-16 PROBLEM — M25.512 ACUTE PAIN OF LEFT SHOULDER: Status: ACTIVE | Noted: 2021-09-16

## 2021-09-16 PROBLEM — M65.4 DE QUERVAIN'S TENOSYNOVITIS, LEFT: Status: ACTIVE | Noted: 2021-09-16

## 2021-11-04 DIAGNOSIS — M25.539 PAIN IN WRIST, UNSPECIFIED LATERALITY: ICD-10-CM

## 2021-11-04 DIAGNOSIS — I10 ESSENTIAL HYPERTENSION: ICD-10-CM

## 2021-11-04 DIAGNOSIS — M62.830 SPASM OF MUSCLE OF LOWER BACK: ICD-10-CM

## 2021-11-04 RX ORDER — AMLODIPINE BESYLATE 2.5 MG/1
2.5 TABLET ORAL DAILY
Qty: 90 TABLET | Refills: 1 | Status: SHIPPED | OUTPATIENT
Start: 2021-11-04

## 2021-11-04 RX ORDER — CYCLOBENZAPRINE HCL 10 MG
10 TABLET ORAL 3 TIMES DAILY PRN
Qty: 270 TABLET | Refills: 0 | Status: SHIPPED | OUTPATIENT
Start: 2021-11-04

## 2021-11-04 RX ORDER — IBUPROFEN 600 MG/1
600 TABLET ORAL EVERY 8 HOURS
Qty: 30 TABLET | Refills: 0 | Status: SHIPPED | OUTPATIENT
Start: 2021-11-04

## 2022-01-10 ENCOUNTER — LAB ENCOUNTER (OUTPATIENT)
Dept: LAB | Age: 52
End: 2022-01-10
Attending: FAMILY MEDICINE
Payer: COMMERCIAL

## 2022-01-10 ENCOUNTER — OFFICE VISIT (OUTPATIENT)
Dept: FAMILY MEDICINE CLINIC | Facility: CLINIC | Age: 52
End: 2022-01-10
Payer: COMMERCIAL

## 2022-01-10 ENCOUNTER — HOSPITAL ENCOUNTER (OUTPATIENT)
Dept: GENERAL RADIOLOGY | Age: 52
Discharge: HOME | End: 2022-01-10
Attending: FAMILY MEDICINE
Payer: COMMERCIAL

## 2022-01-10 VITALS
HEART RATE: 96 BPM | HEIGHT: 63 IN | DIASTOLIC BLOOD PRESSURE: 85 MMHG | TEMPERATURE: 98 F | WEIGHT: 248.25 LBS | BODY MASS INDEX: 43.98 KG/M2 | SYSTOLIC BLOOD PRESSURE: 126 MMHG

## 2022-01-10 DIAGNOSIS — M25.532 WRIST PAIN, ACUTE, LEFT: ICD-10-CM

## 2022-01-10 DIAGNOSIS — M25.512 ACUTE PAIN OF LEFT SHOULDER: ICD-10-CM

## 2022-01-10 DIAGNOSIS — Z20.822 CLOSE EXPOSURE TO COVID-19 VIRUS: ICD-10-CM

## 2022-01-10 DIAGNOSIS — E11.9 TYPE 2 DIABETES MELLITUS WITHOUT COMPLICATION, WITHOUT LONG-TERM CURRENT USE OF INSULIN (HCC): Primary | ICD-10-CM

## 2022-01-10 DIAGNOSIS — I10 PRIMARY HYPERTENSION: ICD-10-CM

## 2022-01-10 LAB
CARTRIDGE LOT#: ABNORMAL NUMERIC
HEMOGLOBIN A1C: 6.8 % (ref 4.3–5.6)
SARS-COV-2 IGG+IGM SERPL QL IA: REACTIVE

## 2022-01-10 PROCEDURE — 3008F BODY MASS INDEX DOCD: CPT | Performed by: FAMILY MEDICINE

## 2022-01-10 PROCEDURE — 73110 X-RAY EXAM OF WRIST: CPT | Performed by: FAMILY MEDICINE

## 2022-01-10 PROCEDURE — 3079F DIAST BP 80-89 MM HG: CPT | Performed by: FAMILY MEDICINE

## 2022-01-10 PROCEDURE — 99214 OFFICE O/P EST MOD 30 MIN: CPT | Performed by: FAMILY MEDICINE

## 2022-01-10 PROCEDURE — 73030 X-RAY EXAM OF SHOULDER: CPT | Performed by: FAMILY MEDICINE

## 2022-01-10 PROCEDURE — 36415 COLL VENOUS BLD VENIPUNCTURE: CPT

## 2022-01-10 PROCEDURE — 3044F HG A1C LEVEL LT 7.0%: CPT | Performed by: FAMILY MEDICINE

## 2022-01-10 PROCEDURE — 3074F SYST BP LT 130 MM HG: CPT | Performed by: FAMILY MEDICINE

## 2022-01-10 PROCEDURE — 86769 SARS-COV-2 COVID-19 ANTIBODY: CPT

## 2022-01-10 PROCEDURE — 83036 HEMOGLOBIN GLYCOSYLATED A1C: CPT | Performed by: FAMILY MEDICINE

## 2022-01-10 RX ORDER — METHYLPREDNISOLONE 4 MG/1
TABLET ORAL
Qty: 1 EACH | Refills: 0 | Status: SHIPPED | OUTPATIENT
Start: 2022-01-10

## 2022-01-10 NOTE — PATIENT INSTRUCTIONS
Recommend weight loss via daily exercising and consistent healthy dietary changes. Medication reviewed and renewed where needed and appropriate. Comply with medications. Monitor blood pressures and record at home. Limit salt intake.   X-ray of the left w

## 2022-01-11 NOTE — PROGRESS NOTES
Subjective:   Patient ID: Bonny Morton is a 46year old female. This patient is a 47-year-old hypertensive/diabetic -American female who is here today to follow-up on these chronic illnesses.   Fortunately she denies chest pain, headaches, dizz TAKE 1 TABLET WITH BREAKFAST TWICE A DAY ORALLY     • LORazepam 0.5 MG Oral Tab Take 1 tablet (0.5 mg total) by mouth 2 (two) times daily as needed for Anxiety.  60 tablet 0   • Zolpidem Tartrate 10 MG Oral Tab Take 1 tablet (10 mg total) by mouth nightly a COMPLETE (MIN 2 VIEWS), LEFT (CPT=73030); Future  - methylPREDNISolone (MEDROL) 4 MG Oral Tablet Therapy Pack; As directed. Dispense: 1 each; Refill: 0    3. Acute pain of left shoulder  The following has been ordered and prescribed.   - XR WRIST COMPLETE

## 2022-01-28 DIAGNOSIS — I10 ESSENTIAL HYPERTENSION: ICD-10-CM

## 2022-01-28 RX ORDER — LOSARTAN POTASSIUM AND HYDROCHLOROTHIAZIDE 25; 100 MG/1; MG/1
1 TABLET ORAL DAILY
Qty: 90 TABLET | Refills: 1 | Status: SHIPPED | OUTPATIENT
Start: 2022-01-28

## 2022-01-28 NOTE — TELEPHONE ENCOUNTER
Please review; protocol failed.     Requested Prescriptions   Pending Prescriptions Disp Refills    LOSARTAN-HYDROCHLOROTHIAZIDE 100-25 MG Oral Tab [Pharmacy Med Name: LOSARTAN-HCTZ 100-25 MG TAB] 90 tablet 1     Sig: TAKE 1 TABLET BY MOUTH EVERY DAY

## 2022-04-11 ENCOUNTER — HOSPITAL ENCOUNTER (OUTPATIENT)
Dept: MAMMOGRAPHY | Age: 52
Discharge: HOME | End: 2022-04-11
Attending: FAMILY MEDICINE
Payer: COMMERCIAL

## 2022-04-11 DIAGNOSIS — Z12.31 ENCOUNTER FOR SCREENING MAMMOGRAM FOR BREAST CANCER: ICD-10-CM

## 2022-04-11 PROCEDURE — 77067 SCR MAMMO BI INCL CAD: CPT | Performed by: FAMILY MEDICINE

## 2022-04-11 PROCEDURE — 77063 BREAST TOMOSYNTHESIS BI: CPT | Performed by: FAMILY MEDICINE

## 2022-06-17 DIAGNOSIS — K58.9 IRRITABLE BOWEL SYNDROME, UNSPECIFIED TYPE: ICD-10-CM

## 2022-06-18 RX ORDER — HYOSCYAMINE SULFATE 0.125 MG
0.12 TABLET,DISINTEGRATING ORAL EVERY 4 HOURS PRN
Qty: 60 TABLET | Refills: 0 | Status: SHIPPED | OUTPATIENT
Start: 2022-06-18

## 2022-06-28 DIAGNOSIS — K58.9 IRRITABLE BOWEL SYNDROME, UNSPECIFIED TYPE: ICD-10-CM

## 2022-06-29 RX ORDER — HYOSCYAMINE SULFATE 0.125 MG
0.12 TABLET,DISINTEGRATING ORAL EVERY 4 HOURS PRN
Qty: 60 TABLET | Refills: 0 | Status: SHIPPED | OUTPATIENT
Start: 2022-06-29

## 2022-07-19 ENCOUNTER — TELEPHONE (OUTPATIENT)
Dept: INTERNAL MEDICINE CLINIC | Facility: CLINIC | Age: 52
End: 2022-07-19

## 2022-07-29 DIAGNOSIS — I10 ESSENTIAL HYPERTENSION: ICD-10-CM

## 2022-07-29 RX ORDER — LOSARTAN POTASSIUM AND HYDROCHLOROTHIAZIDE 25; 100 MG/1; MG/1
TABLET ORAL
Qty: 90 TABLET | Refills: 1 | Status: SHIPPED | OUTPATIENT
Start: 2022-07-29

## 2022-07-30 DIAGNOSIS — I10 ESSENTIAL HYPERTENSION: ICD-10-CM

## 2022-07-30 RX ORDER — AMLODIPINE BESYLATE 2.5 MG/1
2.5 TABLET ORAL DAILY
Qty: 90 TABLET | Refills: 1 | Status: SHIPPED | OUTPATIENT
Start: 2022-07-30

## 2022-09-02 ENCOUNTER — OFFICE VISIT (OUTPATIENT)
Dept: FAMILY MEDICINE CLINIC | Facility: CLINIC | Age: 52
End: 2022-09-02
Payer: COMMERCIAL

## 2022-09-02 ENCOUNTER — PATIENT MESSAGE (OUTPATIENT)
Dept: FAMILY MEDICINE CLINIC | Facility: CLINIC | Age: 52
End: 2022-09-02

## 2022-09-02 VITALS
HEART RATE: 81 BPM | WEIGHT: 245.63 LBS | HEIGHT: 63 IN | OXYGEN SATURATION: 97 % | TEMPERATURE: 99 F | RESPIRATION RATE: 16 BRPM | SYSTOLIC BLOOD PRESSURE: 122 MMHG | DIASTOLIC BLOOD PRESSURE: 80 MMHG | BODY MASS INDEX: 43.52 KG/M2

## 2022-09-02 DIAGNOSIS — I10 ESSENTIAL HYPERTENSION: ICD-10-CM

## 2022-09-02 DIAGNOSIS — J01.90 ACUTE RHINOSINUSITIS: ICD-10-CM

## 2022-09-02 DIAGNOSIS — M25.532 CHRONIC PAIN OF LEFT WRIST: ICD-10-CM

## 2022-09-02 DIAGNOSIS — N89.8 VAGINAL DISCHARGE: ICD-10-CM

## 2022-09-02 DIAGNOSIS — G89.29 CHRONIC PAIN OF LEFT WRIST: ICD-10-CM

## 2022-09-02 DIAGNOSIS — E66.01 MORBID OBESITY WITH BMI OF 40.0-44.9, ADULT (HCC): ICD-10-CM

## 2022-09-02 DIAGNOSIS — H92.01 RIGHT EAR PAIN: ICD-10-CM

## 2022-09-02 DIAGNOSIS — H61.21 IMPACTED CERUMEN OF RIGHT EAR: ICD-10-CM

## 2022-09-02 DIAGNOSIS — R09.81 NASAL SINUS CONGESTION: Primary | ICD-10-CM

## 2022-09-02 PROCEDURE — 3079F DIAST BP 80-89 MM HG: CPT | Performed by: FAMILY MEDICINE

## 2022-09-02 PROCEDURE — 3074F SYST BP LT 130 MM HG: CPT | Performed by: FAMILY MEDICINE

## 2022-09-02 PROCEDURE — 3008F BODY MASS INDEX DOCD: CPT | Performed by: FAMILY MEDICINE

## 2022-09-02 PROCEDURE — 99214 OFFICE O/P EST MOD 30 MIN: CPT | Performed by: FAMILY MEDICINE

## 2022-09-02 PROCEDURE — 69209 REMOVE IMPACTED EAR WAX UNI: CPT | Performed by: FAMILY MEDICINE

## 2022-09-02 RX ORDER — FEXOFENADINE HCL AND PSEUDOEPHEDRINE HCI 60; 120 MG/1; MG/1
1 TABLET, EXTENDED RELEASE ORAL 2 TIMES DAILY
Qty: 30 TABLET | Refills: 0 | Status: SHIPPED | OUTPATIENT
Start: 2022-09-02

## 2022-09-02 RX ORDER — AMOXICILLIN AND CLAVULANATE POTASSIUM 875; 125 MG/1; MG/1
1 TABLET, FILM COATED ORAL 2 TIMES DAILY
Qty: 20 TABLET | Refills: 0 | Status: SHIPPED | OUTPATIENT
Start: 2022-09-02 | End: 2022-09-12

## 2022-09-02 NOTE — PROCEDURES
EAC(s) irrigated manually to clear with h2o/h2o2 10:1 solution with syringe. Patient tolerated procedure without any complication. Hearing completely restored AD. Daily ear canal recommended (no deep swabbing into canals or any instrumentation deep into the ear canal). Curette also utilized to remove the bolus of wax found at the opening of the [address].

## 2022-09-02 NOTE — PATIENT INSTRUCTIONS
Augmentin 875 prescribed twice daily for 10 days along with Allegra-D 12-hour. Clear water encouraged. Dairy is discouraged. Right ear irrigated to clear. Patient should continue with her nasal spray as well.   EMG for the left upper extremity has been placed on the chart if the patient's left arm/wrist/hand symptoms persist.

## 2022-11-21 DIAGNOSIS — I10 ESSENTIAL HYPERTENSION: ICD-10-CM

## 2022-11-21 DIAGNOSIS — M25.539 PAIN IN WRIST, UNSPECIFIED LATERALITY: ICD-10-CM

## 2022-11-21 RX ORDER — IBUPROFEN 600 MG/1
600 TABLET ORAL EVERY 8 HOURS
Qty: 30 TABLET | Refills: 0 | OUTPATIENT
Start: 2022-11-21

## 2022-11-22 NOTE — TELEPHONE ENCOUNTER
Please review. Protocol failed / No Protocol. Requested Prescriptions   Pending Prescriptions Disp Refills    ibuprofen 600 MG Oral Tab 30 tablet 0     Sig: Take 1 tablet (600 mg total) by mouth every 8 (eight) hours. Non-Narcotic Pain Medication Protocol Passed - 11/21/2022  6:05 PM        Passed - In person appointment or virtual visit in the past 6 mos or appointment in next 3 mos     Recent Outpatient Visits              2 months ago Nasal sinus congestion    [address], [address] [place]    Office Visit    10 months ago Type 2 diabetes mellitus without complication, without long-term current use of insulin [facility])    [address] [facility] [address] [place] [address], [address], DO    Office Visit    1 year ago Left wrist pain    Orthopaedics - [address] MD    Office Visit    1 year ago Left wrist pain    Orthopaedics - [address] MD    Office Visit    1 year ago Encounter for screening mammogram for breast cancer    [address] [place] [address], [address], DO    Office Visit                        losartan-hydroCHLOROthiazide 100-25 MG Oral Tab 90 tablet 1     Sig: Take 1 tablet by mouth daily. Hypertensive Medications Protocol Failed - 11/21/2022  6:05 PM        Failed - CMP or BMP in past 6 months     No results found for this or any previous visit (from the past 4392 hour(s)).             Failed - EGFRCR or GFRAA > 50     GFR Evaluation            Passed - In person appointment in the past 12 or next 3 months     Recent Outpatient Visits              2 months ago Nasal sinus congestion    [address] [place] [address], [address] [place]    Office Visit    10 months ago Type 2 diabetes mellitus without complication, without long-term current use of insulin [facility])    [address], [address] [place]    Office Visit    1 year ago Left wrist pain Maisha Tirado, Brock Dunn MD    Office Visit    1 year ago Left wrist pain    Orthopaedics - [address] MD    Office Visit    1 year ago Encounter for screening mammogram for breast cancer    [address] [place] [address]    Office Visit                      Passed - Last BP reading less than 140/90     BP Readings from Last 1 Encounters:  09/02/22 : 122/80              Passed - In person appointment or virtual visit in the past 6 months     Recent Outpatient Visits              2 months ago Nasal sinus congestion    [address] [place] [address]    Office Visit    10 months ago Type 2 diabetes mellitus without complication, without long-term current use of insulin [address]    Office Visit    1 year ago Left wrist pain    Orthopaedics - [address], Vira Dancer, MD    Office Visit    1 year ago Left wrist pain    Orthopaedics - [address], Vira Dancer, MD    Office Visit    1 year ago Encounter for screening mammogram for breast cancer    [address] [place] [address]    Office Visit                        amLODIPine 2.5 MG Oral Tab 90 tablet 1     Sig: Take 1 tablet (2.5 mg total) by mouth daily. Hypertensive Medications Protocol Failed - 11/21/2022  6:05 PM        Failed - CMP or BMP in past 6 months     No results found for this or any previous visit (from the past 4392 hour(s)).             Failed - EGFRCR or GFRAA > 50     GFR Evaluation            Passed - In person appointment in the past 12 or next 3 months     Recent Outpatient Visits              2 months ago Nasal sinus congestion    [address] [place] [address]    Office Visit    10 months ago Type 2 diabetes mellitus without complication, without long-term current use of insulin [facility])    [address] Dozier, Robert Gusman [place]    Office Visit    1 year ago Left wrist pain    Orthopaedics - [address], Edin Landon MD    Office Visit    1 year ago Left wrist pain    Orthopaedics - [address] [place]    Office Visit    1 year ago Encounter for screening mammogram for [place] cancer    [place] [facility] [place]     Office Visit                      Passed - Last BP reading less than 140/90     BP Readings from Last 1 Encounters:  09/02/22 : 122/80              Passed - In person appointment or virtual visit in the past 6 months     Recent Outpatient Visits              2 months ago Nasal sinus congestion    [place] [facility], [facility] [place]    Office Visit    10 months ago Type 2 diabetes mellitus without complication, without long-term current use of insulin [facility])    [place] [facility] [place]    Office Visit    1 year ago Left wrist pain    Orthopaedics - [address] MD    Office Visit    1 year ago Left wrist pain    Orthopaedics - [address] MD    Office Visit    1 year ago Encounter for screening mammogram for breast cancer    [place] [facility] [place]    Office Visit

## 2022-11-25 RX ORDER — IBUPROFEN 600 MG/1
600 TABLET ORAL EVERY 8 HOURS
Qty: 30 TABLET | Refills: 0 | Status: SHIPPED | OUTPATIENT
Start: 2022-11-25

## 2022-11-25 RX ORDER — LOSARTAN POTASSIUM AND HYDROCHLOROTHIAZIDE 25; 100 MG/1; MG/1
1 TABLET ORAL DAILY
Qty: 90 TABLET | Refills: 1 | Status: SHIPPED | OUTPATIENT
Start: 2022-11-25

## 2022-11-25 RX ORDER — AMLODIPINE BESYLATE 2.5 MG/1
2.5 TABLET ORAL DAILY
Qty: 90 TABLET | Refills: 1 | Status: SHIPPED | OUTPATIENT
Start: 2022-11-25

## 2023-02-22 DIAGNOSIS — R09.81 NASAL SINUS CONGESTION: ICD-10-CM

## 2023-02-22 DIAGNOSIS — I10 ESSENTIAL HYPERTENSION: ICD-10-CM

## 2023-02-22 RX ORDER — FEXOFENADINE HCL AND PSEUDOEPHEDRINE HCI 60; 120 MG/1; MG/1
1 TABLET, EXTENDED RELEASE ORAL 2 TIMES DAILY
Qty: 30 TABLET | Refills: 0 | Status: SHIPPED | OUTPATIENT
Start: 2023-02-22

## 2023-02-22 RX ORDER — AMLODIPINE BESYLATE 2.5 MG/1
2.5 TABLET ORAL DAILY
Qty: 90 TABLET | Refills: 1 | Status: SHIPPED | OUTPATIENT
Start: 2023-02-22

## 2023-03-17 ENCOUNTER — TELEPHONE (OUTPATIENT)
Dept: FAMILY MEDICINE CLINIC | Facility: CLINIC | Age: 53
End: 2023-03-17

## 2023-03-31 NOTE — PROGRESS NOTES
HPI/Subjective:   Patient ID: Elaine Antoine is a 44year old female.     This patient is a 61-year-old -American female well-known to our clinic who presents today for follow-up on hypertension as well as chronic left arm pain in which she has been taking: Reported on 8/24/2020 ) 90 tablet 1   • LORazepam 0.5 MG Oral Tab Take 1 tablet (0.5 mg total) by mouth 2 (two) times daily as needed for Anxiety.  60 tablet 0   • Zolpidem Tartrate 10 MG Oral Tab Take 1 tablet (10 mg total) by mouth nightly as need encouraged. 7. Type 2 diabetes mellitus without complication, without long-term current use of insulin (HCC)  Ordered.   - HEMOGLOBIN A1C    Orders Placed This Encounter      POC Glycohemoglobin [[id number]]      Removal Impacted Cerumen Using Irrigation/Lava Frost (0,1+,2+,3+,4+): 0 Chemical Peel: 17.5% TCA Consent: Prior to the procedure, written consent was obtained and risks were reviewed, including but not limited to: redness, peeling, blistering, pigmentary change, scarring, infection, and pain. Prep: The treated area was degreased with pre-peel cleanser, and vaseline was applied for protection of mucous membranes. Detail Level: Zone Post-Care Instructions: I reviewed with the patient in detail post-care instructions. Patient should avoid sun exposure and wear sun protection. Number Of Coats: 2 Erythema: mild Post Peel Care: After the procedure, the treatment area was washed with soap and water, and a post-peel cream was applied. Sun protection and post-care instructions were reviewed with the patient. Peel done by DANIEL.

## 2023-05-24 ENCOUNTER — TELEPHONE (OUTPATIENT)
Dept: FAMILY MEDICINE CLINIC | Facility: CLINIC | Age: 53
End: 2023-05-24

## 2023-05-24 DIAGNOSIS — Z12.31 ENCOUNTER FOR SCREENING MAMMOGRAM FOR MALIGNANT NEOPLASM OF BREAST: Primary | ICD-10-CM

## 2023-05-24 NOTE — TELEPHONE ENCOUNTER
Patient notified, verbalized understanding. Order placed for mammogram. Patient will discuss dilated eye exam with ophthalmology.   Upcoming appointment changed to physical.     Future Appointments   Date Time Provider Troy Suarez   6/8/2023  3:00 PM [facility]

## 2023-06-20 ENCOUNTER — TELEPHONE (OUTPATIENT)
Dept: FAMILY MEDICINE CLINIC | Facility: CLINIC | Age: 53
End: 2023-06-20

## 2023-06-20 NOTE — TELEPHONE ENCOUNTER
LVM to inform pt that it is time for her annual Gynecological exam with pap and to call our office to schedule an appt with the PA or with one of the Midwives.

## 2023-10-06 ENCOUNTER — OFFICE VISIT (OUTPATIENT)
Dept: FAMILY MEDICINE CLINIC | Facility: CLINIC | Age: 53
End: 2023-10-06

## 2023-10-06 ENCOUNTER — LAB ENCOUNTER (OUTPATIENT)
Dept: LAB | Age: 53
End: 2023-10-06
Attending: FAMILY MEDICINE
Payer: COMMERCIAL

## 2023-10-06 VITALS
BODY MASS INDEX: 44.47 KG/M2 | HEIGHT: 63 IN | DIASTOLIC BLOOD PRESSURE: 72 MMHG | TEMPERATURE: 98 F | WEIGHT: 251 LBS | HEART RATE: 79 BPM | SYSTOLIC BLOOD PRESSURE: 119 MMHG

## 2023-10-06 DIAGNOSIS — R09.81 NASAL SINUS CONGESTION: ICD-10-CM

## 2023-10-06 DIAGNOSIS — E11.9 DIABETIC EYE EXAM (HCC): Primary | ICD-10-CM

## 2023-10-06 DIAGNOSIS — Z12.11 COLON CANCER SCREENING: ICD-10-CM

## 2023-10-06 DIAGNOSIS — Z28.21 HERPES ZOSTER VACCINATION DECLINED: ICD-10-CM

## 2023-10-06 DIAGNOSIS — Z01.00 DIABETIC EYE EXAM (HCC): Primary | ICD-10-CM

## 2023-10-06 DIAGNOSIS — H69.91 DYSFUNCTION OF RIGHT EUSTACHIAN TUBE: ICD-10-CM

## 2023-10-06 DIAGNOSIS — Z28.21 TETANUS, DIPHTHERIA, AND ACELLULAR PERTUSSIS (TDAP) VACCINATION DECLINED: ICD-10-CM

## 2023-10-06 DIAGNOSIS — Z01.419 ENCOUNTER FOR CERVICAL PAP SMEAR WITH PELVIC EXAM: ICD-10-CM

## 2023-10-06 DIAGNOSIS — Z00.00 ROUTINE PHYSICAL EXAMINATION: ICD-10-CM

## 2023-10-06 DIAGNOSIS — Z28.21 INFLUENZA VACCINATION DECLINED BY PATIENT: ICD-10-CM

## 2023-10-06 LAB
ALBUMIN SERPL-MCNC: 3.6 G/DL (ref 3.4–5)
ALBUMIN/GLOB SERPL: 0.7 {RATIO} (ref 1–2)
ALP LIVER SERPL-CCNC: 76 U/L
ALT SERPL-CCNC: 17 U/L
ANION GAP SERPL CALC-SCNC: 10 MMOL/L (ref 0–18)
AST SERPL-CCNC: 12 U/L (ref 15–37)
BASOPHILS # BLD AUTO: 0.04 X10(3) UL (ref 0–0.2)
BASOPHILS NFR BLD AUTO: 0.6 %
BILIRUB SERPL-MCNC: 0.5 MG/DL (ref 0.1–2)
BUN BLD-MCNC: 10 MG/DL (ref 7–18)
BUN/CREAT SERPL: 12 (ref 10–20)
CALCIUM BLD-MCNC: 9.4 MG/DL (ref 8.5–10.1)
CHLORIDE SERPL-SCNC: 104 MMOL/L (ref 98–112)
CHOLEST SERPL-MCNC: 198 MG/DL (ref ?–200)
CO2 SERPL-SCNC: 24 MMOL/L (ref 21–32)
CREAT BLD-MCNC: 0.83 MG/DL
CREAT UR-SCNC: 29.6 MG/DL
DEPRECATED RDW RBC AUTO: 43 FL (ref 35.1–46.3)
EGFRCR SERPLBLD CKD-EPI 2021: 83 ML/MIN/1.73M2 (ref 60–?)
EOSINOPHIL # BLD AUTO: 0.09 X10(3) UL (ref 0–0.7)
EOSINOPHIL NFR BLD AUTO: 1.3 %
ERYTHROCYTE [DISTWIDTH] IN BLOOD BY AUTOMATED COUNT: 13 % (ref 11–15)
EST. AVERAGE GLUCOSE BLD GHB EST-MCNC: 180 MG/DL (ref 68–126)
FASTING PATIENT LIPID ANSWER: YES
FASTING STATUS PATIENT QL REPORTED: YES
GLOBULIN PLAS-MCNC: 4.9 G/DL (ref 2.8–4.4)
GLUCOSE BLD-MCNC: 161 MG/DL (ref 70–99)
HBA1C MFR BLD: 7.9 % (ref ?–5.7)
HCT VFR BLD AUTO: 44.7 %
HDLC SERPL-MCNC: 42 MG/DL (ref 40–59)
HGB BLD-MCNC: 14.5 G/DL
IMM GRANULOCYTES # BLD AUTO: 0.02 X10(3) UL (ref 0–1)
IMM GRANULOCYTES NFR BLD: 0.3 %
LDLC SERPL CALC-MCNC: 140 MG/DL (ref ?–100)
LYMPHOCYTES # BLD AUTO: 2.4 X10(3) UL (ref 1–4)
LYMPHOCYTES NFR BLD AUTO: 33.3 %
MCH RBC QN AUTO: 29.2 PG (ref 26–34)
MCHC RBC AUTO-ENTMCNC: 32.4 G/DL (ref 31–37)
MCV RBC AUTO: 89.9 FL
MICROALBUMIN UR-MCNC: <0.5 MG/DL
MONOCYTES # BLD AUTO: 0.5 X10(3) UL (ref 0.1–1)
MONOCYTES NFR BLD AUTO: 6.9 %
NEUTROPHILS # BLD AUTO: 4.15 X10 (3) UL (ref 1.5–7.7)
NEUTROPHILS # BLD AUTO: 4.15 X10(3) UL (ref 1.5–7.7)
NEUTROPHILS NFR BLD AUTO: 57.6 %
NONHDLC SERPL-MCNC: 156 MG/DL (ref ?–130)
OSMOLALITY SERPL CALC.SUM OF ELEC: 289 MOSM/KG (ref 275–295)
PLATELET # BLD AUTO: 303 10(3)UL (ref 150–450)
POTASSIUM SERPL-SCNC: 3.4 MMOL/L (ref 3.5–5.1)
PROT SERPL-MCNC: 8.5 G/DL (ref 6.4–8.2)
RBC # BLD AUTO: 4.97 X10(6)UL
SODIUM SERPL-SCNC: 138 MMOL/L (ref 136–145)
TRIGL SERPL-MCNC: 90 MG/DL (ref 30–149)
TSI SER-ACNC: 2.7 MIU/ML (ref 0.36–3.74)
VLDLC SERPL CALC-MCNC: 17 MG/DL (ref 0–30)
WBC # BLD AUTO: 7.2 X10(3) UL (ref 4–11)

## 2023-10-06 PROCEDURE — 82043 UR ALBUMIN QUANTITATIVE: CPT

## 2023-10-06 PROCEDURE — 99214 OFFICE O/P EST MOD 30 MIN: CPT | Performed by: FAMILY MEDICINE

## 2023-10-06 PROCEDURE — 85025 COMPLETE CBC W/AUTO DIFF WBC: CPT

## 2023-10-06 PROCEDURE — 3008F BODY MASS INDEX DOCD: CPT | Performed by: FAMILY MEDICINE

## 2023-10-06 PROCEDURE — 3078F DIAST BP <80 MM HG: CPT | Performed by: FAMILY MEDICINE

## 2023-10-06 PROCEDURE — 99396 PREV VISIT EST AGE 40-64: CPT | Performed by: FAMILY MEDICINE

## 2023-10-06 PROCEDURE — 82570 ASSAY OF URINE CREATININE: CPT

## 2023-10-06 PROCEDURE — 3074F SYST BP LT 130 MM HG: CPT | Performed by: FAMILY MEDICINE

## 2023-10-06 PROCEDURE — 36415 COLL VENOUS BLD VENIPUNCTURE: CPT

## 2023-10-06 PROCEDURE — 80061 LIPID PANEL: CPT

## 2023-10-06 PROCEDURE — 80053 COMPREHEN METABOLIC PANEL: CPT

## 2023-10-06 PROCEDURE — 84443 ASSAY THYROID STIM HORMONE: CPT

## 2023-10-06 PROCEDURE — 83036 HEMOGLOBIN GLYCOSYLATED A1C: CPT

## 2023-10-06 RX ORDER — FEXOFENADINE HCL AND PSEUDOEPHEDRINE HCI 60; 120 MG/1; MG/1
1 TABLET, EXTENDED RELEASE ORAL 2 TIMES DAILY
Qty: 30 TABLET | Refills: 0 | Status: SHIPPED | OUTPATIENT
Start: 2023-10-06

## 2023-10-06 NOTE — PATIENT INSTRUCTIONS
All adult screening ordered and done appropriate for patient's age and gender and risk factors and complaints. Medication reviewed and renewed where needed and appropriate. Recommend weight loss via daily exercising and consistent healthy dietary changes. Encouraged physical fitness and daily physical activity daily. Terazol cream prescribed for yeast vaginitis. Fit test given to the patient. Referral to [facility] for updating pelvic/Pap. Allegra-D prescribed regarding nasal sinus congestion and eustachian tube dysfunction.

## 2023-11-02 DIAGNOSIS — M25.539 PAIN IN WRIST, UNSPECIFIED LATERALITY: ICD-10-CM

## 2023-11-02 DIAGNOSIS — I10 ESSENTIAL HYPERTENSION: ICD-10-CM

## 2023-11-03 RX ORDER — LOSARTAN POTASSIUM AND HYDROCHLOROTHIAZIDE 25; 100 MG/1; MG/1
1 TABLET ORAL DAILY
Qty: 90 TABLET | Refills: 3 | Status: SHIPPED | OUTPATIENT
Start: 2023-11-03

## 2023-11-03 RX ORDER — IBUPROFEN 600 MG/1
600 TABLET ORAL EVERY 8 HOURS
Qty: 30 TABLET | Refills: 0 | Status: SHIPPED | OUTPATIENT
Start: 2023-11-03

## 2023-11-03 NOTE — TELEPHONE ENCOUNTER
Refill passed per [facility], [employer] protocol. Requested Prescriptions   Pending Prescriptions Disp Refills    losartan-hydroCHLOROthiazide 100-25 MG Oral Tab 90 tablet 1     Sig: Take 1 tablet by mouth daily.        Hypertensive Medications Protocol Passed - 11/2/2023 11:21 AM        Passed - In person appointment in the past 12 or next 3 months     Recent Outpatient Visits              4 weeks ago Diabetic eye exam [facility])    [address] [facility] [place], [place], [place]    Office Visit    1 year ago Nasal sinus congestion    [facility], [address] [address], [place], [place]    Office Visit    1 year ago Type 2 diabetes mellitus without complication, without long-term current use of insulin [facility])    [address], [facility], [place], [place], [place]    Office Visit    2 years ago Left wrist pain    Orthopaedics - [address], Nani Zimmerman MD    Office Visit    2 years ago Left wrist pain    Orthopaedics - [address], Nani Zimmerman MD    Office Visit          Future Appointments         Provider Department Appt Notes    In 2 months Dorcas Arana DO [address], [facility], [address] [facility] BP reading less than 140/90     BP Readings from Last 1 Encounters:  10/06/23 : 119/72              Passed - CMP or BMP in past 6 months     Recent Results (from the past 4392 hour(s))   CMP    Collection Time: 10/06/23  9:22 AM   Result Value Ref Range    Glucose 161 (H) 70 - 99 mg/dL    Sodium 138 136 - 145 mmol/L    Potassium 3.4 (L) 3.5 - 5.1 mmol/L    Chloride 104 98 - 112 mmol/L    CO2 24.0 21.0 - 32.0 mmol/L    Anion Gap 10 0 - 18 mmol/L    BUN 10 7 - 18 mg/dL    Creatinine 0.83 0.55 - 1.02 mg/dL    BUN/CREA Ratio 12.0 10.0 - 20.0    Calcium, Total 9.4 8.5 - 10.1 mg/dL    Calculated Osmolality 289 275 - 295 mOsm/kg    eGFR-Cr 83 >=60 mL/min/1.73m2    ALT 17 13 - 56 U/L    AST 12 (L) 15 - 37 U/L    Alkaline Phosphatase 76 41 - 108 U/L    Bilirubin, Total 0.5 0.1 - 2.0 mg/dL    Total Protein 8.5 (H) 6.4 - 8.2 g/dL    Albumin 3.6 3.4 - 5.0 g/dL    Globulin  4.9 (H) 2.8 - 4.4 g/dL    A/G Ratio 0.7 (L) 1.0 - 2.0    Patient Fasting for CMP? Yes      *Note: Due to a large number of results and/or encounters for the requested time period, some results have not been displayed. A complete set of results can be found in Results Review.                Passed - In person appointment or virtual visit in the past 6 months     Recent Outpatient Visits              4 weeks ago Diabetic eye exam [facility])    [facility] [address] [place], [place]    Office Visit    1 year ago Nasal sinus congestion    [facility] [address] [place] [address]    Office Visit    1 year ago Type 2 diabetes mellitus without complication, without long-term current use of insulin [facility])    [address] , [place], [place]    Office Visit    2 years ago Left wrist pain    Orthopaedics - [address] MD    Office Visit    2 years ago Left wrist pain    Orthopaedics - [address] Stau[address] MD    Office Visit          Future Appointments         Provider Department Appt Notes    In 2 months DO Maurice Hoskins [facility] [address] [facility] or GFRAA > 50     GFR Evaluation  EGFRCR: 83 , resulted on 10/6/2023             Recent Outpatient Visits              4 weeks ago Diabetic eye exam [facility])    [address], [place]    Office Visit    1 year ago Nasal sinus congestion    [facility] [place], [place]    Office Visit    1 year ago Type 2 diabetes mellitus without complication, without long-term current use of insulin [facility])    [address] [facility], [place], [place]    Office Visit    2 years ago Left wrist pain    Orthopaedics - [address] MD    Office Visit    2 years ago Left wrist pain    Orthopaedics - [address] MD    Office Visit          Future Appointments         Provider Department Appt Notes    In 2 months Vincent Gallo, DO [address], [facility]

## 2023-11-03 NOTE — TELEPHONE ENCOUNTER
Refill passed per [facility], [facility] protocol. Requested Prescriptions   Pending Prescriptions Disp Refills    ibuprofen 600 MG Oral Tab 30 tablet 0     Sig: Take 1 tablet (600 mg total) by mouth every 8 (eight) hours.        Non-Narcotic Pain Medication Protocol Passed - 11/2/2023 11:22 AM        Passed - In person appointment or virtual visit in the past 6 mos or appointment in next 3 mos     Recent Outpatient Visits              4 weeks ago Diabetic eye exam [facility])    [place]    Office Visit    1 year ago Nasal sinus congestion    [facility], [address], [place], [place]    Office Visit    1 year ago Type 2 diabetes mellitus without complication, without long-term current use of insulin [facility])    [address], [address], [place], [place]    Office Visit    2 years ago Left wrist pain    Orthopaedics - [address] MD    Office Visit    2 years ago Left wrist pain    Orthopaedics - [address] MD    Office Visit          Future Appointments         Provider Department Appt Notes    In 2 months Marta Allison DO [address], [address], [place]                          Recent Outpatient Visits              4 weeks ago Diabetic eye exam [facility])    [address], Pia Hester DO    Office Visit    1 year ago Nasal sinus congestion    [facility], [address], [place], [place]    Office Visit    1 year ago Type 2 diabetes mellitus without complication, without long-term current use of insulin [facility])    [address], [address], [facility] [place]    Office Visit    2 years ago Left wrist pain    Orthopaedics - [address] MD    Office Visit    2 years ago Left wrist pain    Orthopaedics - [address], Brock Wood MD    Office Visit          Future Appointments         Provider Department Appt Notes    In 2 months DO Sandy Guzman As[place], [address], [facility]

## 2024-01-08 ENCOUNTER — OFFICE VISIT (OUTPATIENT)
Dept: FAMILY MEDICINE CLINIC | Facility: CLINIC | Age: 54
End: 2024-01-08
Payer: COMMERCIAL

## 2024-01-08 ENCOUNTER — LAB ENCOUNTER (OUTPATIENT)
Dept: LAB | Age: 54
End: 2024-01-08
Attending: FAMILY MEDICINE
Payer: COMMERCIAL

## 2024-01-08 VITALS
HEIGHT: 63 IN | DIASTOLIC BLOOD PRESSURE: 85 MMHG | BODY MASS INDEX: 46.07 KG/M2 | WEIGHT: 260 LBS | OXYGEN SATURATION: 99 % | SYSTOLIC BLOOD PRESSURE: 110 MMHG | RESPIRATION RATE: 18 BRPM | HEART RATE: 75 BPM

## 2024-01-08 DIAGNOSIS — I10 PRIMARY HYPERTENSION: ICD-10-CM

## 2024-01-08 DIAGNOSIS — J01.90 ACUTE RHINOSINUSITIS: ICD-10-CM

## 2024-01-08 DIAGNOSIS — E55.9 VITAMIN D INSUFFICIENCY: ICD-10-CM

## 2024-01-08 DIAGNOSIS — E66.01 MORBID OBESITY WITH BMI OF 45.0-49.9, ADULT (HCC): ICD-10-CM

## 2024-01-08 DIAGNOSIS — Z12.11 COLON CANCER SCREENING: ICD-10-CM

## 2024-01-08 DIAGNOSIS — E11.9 TYPE 2 DIABETES MELLITUS WITHOUT COMPLICATION, WITHOUT LONG-TERM CURRENT USE OF INSULIN (HCC): Primary | ICD-10-CM

## 2024-01-08 LAB
CARTRIDGE LOT#: 8073 NUMERIC
HEMOCCULT STL QL: NEGATIVE
HEMOGLOBIN A1C: 8.4 % (ref 4.3–5.6)

## 2024-01-08 PROCEDURE — 99214 OFFICE O/P EST MOD 30 MIN: CPT | Performed by: FAMILY MEDICINE

## 2024-01-08 PROCEDURE — 3052F HG A1C>EQUAL 8.0%<EQUAL 9.0%: CPT | Performed by: FAMILY MEDICINE

## 2024-01-08 PROCEDURE — 83036 HEMOGLOBIN GLYCOSYLATED A1C: CPT | Performed by: FAMILY MEDICINE

## 2024-01-08 PROCEDURE — 3008F BODY MASS INDEX DOCD: CPT | Performed by: FAMILY MEDICINE

## 2024-01-08 PROCEDURE — 3079F DIAST BP 80-89 MM HG: CPT | Performed by: FAMILY MEDICINE

## 2024-01-08 PROCEDURE — 82274 ASSAY TEST FOR BLOOD FECAL: CPT

## 2024-01-08 PROCEDURE — 3074F SYST BP LT 130 MM HG: CPT | Performed by: FAMILY MEDICINE

## 2024-01-08 RX ORDER — AMOXICILLIN AND CLAVULANATE POTASSIUM 875; 125 MG/1; MG/1
1 TABLET, FILM COATED ORAL 2 TIMES DAILY
Qty: 20 TABLET | Refills: 0 | Status: SHIPPED | OUTPATIENT
Start: 2024-01-08 | End: 2024-01-18

## 2024-01-08 RX ORDER — ERGOCALCIFEROL 1.25 MG/1
50000 CAPSULE ORAL WEEKLY
Qty: 12 CAPSULE | Refills: 0 | Status: SHIPPED | OUTPATIENT
Start: 2024-01-08 | End: 2024-04-01

## 2024-01-08 NOTE — PATIENT INSTRUCTIONS
Medication reviewed and renewed where needed and appropriate.  Comply with medications.  Monitor blood pressures and record at home. Limit salt intake.  Encouraged physical fitness and daily physical activity daily.  Continue with Allegra D 12 H.  A1c done on today.  Vitamin D 3 prescribed.

## 2024-01-15 NOTE — PROGRESS NOTES
Subjective:     Patient ID: Adriana Hernandez is a 53 year old female.    This patient is a 53-year-old hypertensive/diabetic/obese by definition -American female who presents primarily with persistent and worsening nasal sinus congestion with severe headaches over the last 2 weeks without any resolution.  COVID test was negative from home.  Patient feels as though she does have another sinus infection process going on.  Patient is here for an evaluation and hopeful diagnosis and treatment regarding these symptoms.    Taste and smell are intact.  There has not been any measured fever.  There is no nausea or vomiting.  There is no loose stool or diarrhea.    Her usual Allegra-D is not working as well as it has worked in the past.        History/Other:   Review of Systems  Current Outpatient Medications   Medication Sig Dispense Refill    amoxicillin clavulanate 875-125 MG Oral Tab Take 1 tablet by mouth 2 (two) times daily for 10 days. 20 tablet 0    ergocalciferol 1.25 MG (97821 UT) Oral Cap Take 1 capsule (50,000 Units total) by mouth once a week. 12 capsule 0    losartan-hydroCHLOROthiazide 100-25 MG Oral Tab Take 1 tablet by mouth daily. 90 tablet 3    ibuprofen 600 MG Oral Tab Take 1 tablet (600 mg total) by mouth every 8 (eight) hours. 30 tablet 0    amLODIPine 2.5 MG Oral Tab Take 1 tablet (2.5 mg total) by mouth daily. 90 tablet 1    HYOSCYAMINE SULFATE 0.125 MG Oral Tablet Dispersible PLACE 1 TABLET (0.125 MG TOTAL) UNDER THE TONGUE EVERY 4 (FOUR) HOURS AS NEEDED. 60 tablet 0    cyclobenzaprine 10 MG Oral Tab Take 1 tablet (10 mg total) by mouth 3 (three) times daily as needed for Muscle spasms. 270 tablet 0    hydrochlorothiazide 25 MG Oral Tab Take 1 tablet (25 mg total) by mouth daily. 90 tablet 1    Fluticasone Propionate 50 MCG/ACT Nasal Suspension 2 sprays by Nasal route daily. 3 Inhaler 3    SYNJARDY XR 12.5-1000 MG Oral Tablet 24 Hr TAKE 1 TABLET WITH BREAKFAST TWICE A DAY ORALLY      Glucose  Blood (ONETOUCH VERIO) In Vitro Strip Use to test blood glucose level 1 time per day 100 each 0    ONETOUCH DELICA LANCETS 33G Does not apply Misc Use to test blood glucose level daily 100 each 0    simvastatin 20 MG Oral Tab Take 1 tablet (20 mg total) by mouth nightly. (Patient not taking: Reported on 1/8/2024) 90 tablet 1    fexofenadine-pseudoephedrine ER  MG Oral Tablet 12 Hr Take 1 tablet by mouth 2 (two) times daily. (Patient not taking: Reported on 1/8/2024) 30 tablet 0    LORazepam 0.5 MG Oral Tab Take 1 tablet (0.5 mg total) by mouth 2 (two) times daily as needed for Anxiety. (Patient not taking: Reported on 1/8/2024) 60 tablet 0    Zolpidem Tartrate 10 MG Oral Tab Take 1 tablet (10 mg total) by mouth nightly as needed for Sleep. (Patient not taking: Reported on 10/6/2023) 30 tablet 1     Allergies:  Allergies   Allergen Reactions    Torrez        Past Medical History:   Diagnosis Date    Anxiety state, unspecified     Diabetes (HCC)     High blood pressure     Unspecified essential hypertension       Past Surgical History:   Procedure Laterality Date    ELECTROCARDIOGRAM, COMPLETE  5/12/2012    scanned to media tab      Family History   Problem Relation Age of Onset    Hypertension Father     Diabetes Father     Lipids Father         hyperlipidemia    Glaucoma Father     Hypertension Mother     Ovarian Cancer Paternal Grandmother         70's      Social History:   Social History     Socioeconomic History    Marital status: Single   Tobacco Use    Smoking status: Never    Smokeless tobacco: Never   Vaping Use    Vaping Use: Never used   Substance and Sexual Activity    Alcohol use: Yes     Comment: beer & liquor, 2 glasses occasionally    Drug use: No   Other Topics Concern    Caffeine Concern Yes     Comment: coffee, soda    Left Handed No    Right Handed Yes    Currently spends a great deal of time in the sun No    History of tanning No    Hx of Spending Great Deal of Time in Sun No    Bad  sunburns in the past No    Tanning Salons in the Past No    Hx of Radiation Treatments No        Objective:   Vitals:    01/08/24 0942   BP: 110/85   Pulse:    Resp:        Physical Exam  Constitutional:       General: She is not in acute distress.     Appearance: She is obese. She is not ill-appearing.   HENT:      Head: Normocephalic and atraumatic.      Nose: Congestion and rhinorrhea present.      Right Turbinates: Enlarged, swollen and pale.      Left Turbinates: Enlarged, swollen and pale.      Right Sinus: Maxillary sinus tenderness and frontal sinus tenderness present.      Left Sinus: Maxillary sinus tenderness and frontal sinus tenderness present.      Comments: Both pain and release elicited from compressed sinuses.  Neck:      Thyroid: No thyromegaly.   Cardiovascular:      Rate and Rhythm: Normal rate and regular rhythm.      Heart sounds:      No gallop.   Pulmonary:      Breath sounds: Normal breath sounds.   Neurological:      Mental Status: She is alert.   Psychiatric:         Mood and Affect: Mood normal.         Assessment & Plan:   1. Acute rhinosinusitis  Prescribed.  - amoxicillin clavulanate 875-125 MG Oral Tab; Take 1 tablet by mouth 2 (two) times daily for 10 days.  Dispense: 20 tablet; Refill: 0    2. Primary hypertension  To goal by criteria.    3. Morbid obesity with BMI of 45.0-49.9, adult ([facility])  Recommend weight loss via daily exercising and consistent healthy dietary changes.    4. Type 2 diabetes mellitus without complication, without long-term current use of insulin ([facility])  Status update.  - Hemoglobin A1C; Standing  - Hemoglobin A1C  - POC Glycohemoglobin [09629]    5. Vitamin D insufficiency  Prescribed  - ergocalciferol 1.25 MG (70061 UT) Oral Cap; Take 1 capsule (50,000 Units total) by mouth once a week.  Dispense: 12 capsule; Refill: 0      Orders Placed This Encounter   Procedures    Hemoglobin A1C    POC Glycohemoglobin [25639]       Meds This Visit:  Requested Prescriptions      Signed Prescriptions Disp Refills    amoxicillin clavulanate 875-125 MG Oral Tab 20 tablet 0     Sig: Take 1 tablet by mouth 2 (two) times daily for 10 days.    ergocalciferol 1.25 MG (35844 UT) Oral Cap 12 capsule 0     Sig: Take 1 capsule (50,000 Units total) by mouth once a week.       Imaging & Referrals:  None     Patient Instructions   Medication reviewed and renewed where needed and appropriate.  Comply with medications.  Monitor blood pressures and record at home. Limit salt intake.  Encouraged physical fitness and daily physical activity daily.  Continue with Allegra D 12 H.  A1c done on today.  Vitamin D 3 prescribed.    Return if symptoms worsen or fail to improve.

## 2024-01-28 DIAGNOSIS — E11.9 TYPE 2 DIABETES MELLITUS WITHOUT COMPLICATION, WITHOUT LONG-TERM CURRENT USE OF INSULIN (HCC): ICD-10-CM

## 2024-01-29 RX ORDER — SIMVASTATIN 20 MG
20 TABLET ORAL NIGHTLY
Qty: 90 TABLET | Refills: 1 | Status: SHIPPED | OUTPATIENT
Start: 2024-01-29

## 2024-01-30 NOTE — TELEPHONE ENCOUNTER
Please review.Protocol failed/ No protocol      Requested Prescriptions   Pending Prescriptions Disp Refills    SIMVASTATIN 20 MG Oral Tab [Pharmacy Med Name: SIMVASTATIN 20 MG TABLET] 90 tablet 1     Sig: TAKE 1 TABLET BY MOUTH EVERY DAY AT NIGHT       Cholesterol Medication Protocol Failed - 1/28/2024  6:53 AM        Failed - Last LDL < 130     Lab Results   Component Value Date     (H) 10/06/2023             Passed - ALT in past 12 months        Passed - LDL in past 12 months        Passed - Last ALT < 80     Lab Results   Component Value Date    ALT 17 10/06/2023             Passed - In person appointment or virtual visit in the past 12 mos or appointment in next 3 mos     Recent Outpatient Visits              3 weeks ago Type 2 diabetes mellitus without complication, without long-term current use of insulin ([facility])    [facility] Dominik Edmond,     Office Visit    3 months ago Diabetic eye exam (HCC)    [facility] Dominik Edmond,     Office Visit    1 year ago Nasal sinus congestion    [facility] Dominik Edmond,     Office Visit    2 years ago Type 2 diabetes mellitus without complication, without long-term current use of insulin ([facility])    [facility] Dominik Edmond,     Office Visit    2 years ago Left wrist pain    Orthopaedics - [address], Kiya Baron MD    Office Visit          Future Appointments         Provider Department Appt Notes    In 1 month [address]     In 2 months oDminik Edmond DO [facility] sinus infection                    Future Appointments         Provider Department Appt Notes    In 1 month [address]     In 2 months Dominik Edmond DO  [facility] sinus infection             Recent Outpatient Visits              3 weeks ago Type 2 diabetes mellitus without complication, without long-term current use of insulin ([facility])    [facility]    Office Visit    3 months ago Diabetic eye exam ([facility])    [facility]    Office Visit    1 year ago Nasal sinus congestion    [facility] Dominik Edmond, DO    Office Visit    2 years ago Type 2 diabetes mellitus without complication, without long-term current use of insulin ([facility])    [facility]    Office Visit    2 years ago Left wrist pain    Orthopaedics - [address], Kiya Baron MD    Office Visit

## 2024-03-23 ENCOUNTER — HOSPITAL ENCOUNTER (OUTPATIENT)
Dept: MAMMOGRAPHY | Age: 54
Discharge: HOME | End: 2024-03-23
Attending: FAMILY MEDICINE
Payer: COMMERCIAL

## 2024-03-23 DIAGNOSIS — Z12.31 ENCOUNTER FOR SCREENING MAMMOGRAM FOR MALIGNANT NEOPLASM OF BREAST: ICD-10-CM

## 2024-03-23 PROCEDURE — 77063 BREAST TOMOSYNTHESIS BI: CPT | Performed by: FAMILY MEDICINE

## 2024-03-23 PROCEDURE — 77067 SCR MAMMO BI INCL CAD: CPT | Performed by: FAMILY MEDICINE

## 2024-04-26 DIAGNOSIS — R09.81 NASAL SINUS CONGESTION: ICD-10-CM

## 2024-04-26 DIAGNOSIS — J30.9 ALLERGIC RHINITIS, UNSPECIFIED SEASONALITY, UNSPECIFIED TRIGGER: ICD-10-CM

## 2024-04-26 DIAGNOSIS — I10 ESSENTIAL HYPERTENSION: ICD-10-CM

## 2024-04-29 RX ORDER — FLUTICASONE PROPIONATE 50 MCG
2 SPRAY, SUSPENSION (ML) NASAL DAILY
Qty: 48 G | Refills: 3 | Status: SHIPPED | OUTPATIENT
Start: 2024-04-29

## 2024-04-29 RX ORDER — AMLODIPINE BESYLATE 2.5 MG/1
2.5 TABLET ORAL DAILY
Qty: 90 TABLET | Refills: 3 | Status: SHIPPED | OUTPATIENT
Start: 2024-04-29

## 2024-04-29 NOTE — TELEPHONE ENCOUNTER
Refill passed per protocol.    Requested Prescriptions   Pending Prescriptions Disp Refills    fluticasone propionate 50 MCG/ACT Nasal Suspension 48 g 3     Si sprays by Nasal route daily.       Allergy Medication Protocol Passed - 2024  9:12 AM        Passed - In person appointment or virtual visit in the past 12 mos or appointment in next 3 mos     Recent Outpatient Visits              3 months ago Type 2 diabetes mellitus without complication, without long-term current use of insulin ([facility])    [facility] Dominik Edmond, DO    Office Visit    6 months ago Diabetic eye exam ([facility])    [facility] Dominik Edmond, DO    Office Visit    1 year ago Nasal sinus congestion    [facility] Dominik Edmond, DO    Office Visit    2 years ago Type 2 diabetes mellitus without complication, without long-term current use of insulin ([facility])    [facility] Dominik Edmond, DO    Office Visit    2 years ago Left wrist pain    Orthopaedics - [facility] [address], Kiya Baron MD    Office Visit          Future Appointments         Provider Department Appt Notes    In 3 weeks Dominik Edmond, DO [facility] sinus infection                         Future Appointments         Provider Department Appt Notes    In 3 weeks Dominik Edmond, DO [facility] sinus infection          Recent Outpatient Visits              3 months ago Type 2 diabetes mellitus without complication, without long-term current use of insulin ([facility])    [facility] Dominik Edmond, DO    Office Visit    6 months ago Diabetic eye exam ([facility])    [facility] Dominik Edmond, DO    Office Visit    1 year  ago Nasal sinus congestion    [place] Dominik Edmond, DO    Office Visit    2 years ago Type 2 diabetes mellitus without complication, without long-term current use of insulin ([facility])    [place], [facility] Dominik Edmond, DO    Office Visit    2 years ago Left wrist pain    Orthopaedics - [address], Kiya Baron MD    Office Visit

## 2024-08-05 ENCOUNTER — WALK IN (OUTPATIENT)
Dept: URGENT CARE | Age: 54
End: 2024-08-05

## 2024-08-05 ENCOUNTER — APPOINTMENT (OUTPATIENT)
Dept: URGENT CARE | Age: 54
End: 2024-08-05

## 2024-08-05 VITALS
BODY MASS INDEX: 41.51 KG/M2 | HEIGHT: 65 IN | DIASTOLIC BLOOD PRESSURE: 83 MMHG | HEART RATE: 98 BPM | RESPIRATION RATE: 16 BRPM | TEMPERATURE: 97.4 F | WEIGHT: 249.12 LBS | OXYGEN SATURATION: 99 % | SYSTOLIC BLOOD PRESSURE: 118 MMHG

## 2024-08-05 DIAGNOSIS — R51.9 GENERALIZED HEADACHES: ICD-10-CM

## 2024-08-05 DIAGNOSIS — D64.9 ANEMIA, UNSPECIFIED TYPE: Primary | ICD-10-CM

## 2024-08-05 LAB
FLUAV AG UPPER RESP QL IA.RAPID: NEGATIVE
FLUBV AG UPPER RESP QL IA.RAPID: NEGATIVE
HGB BLD CALC-MCNC: 8.9 G/DL
INTERNAL PROCEDURAL CONTROLS ACCEPTABLE: YES
SARS-COV+SARS-COV-2 AG RESP QL IA.RAPID: NOT DETECTED
TEST LOT EXPIRATION DATE: 1
TEST LOT EXPIRATION DATE: 1
TEST LOT NUMBER: 1
TEST LOT NUMBER: 1

## 2024-08-05 RX ORDER — ONDANSETRON 4 MG/1
4 TABLET, ORALLY DISINTEGRATING ORAL ONCE
Status: COMPLETED | OUTPATIENT
Start: 2024-08-05 | End: 2024-08-05

## 2024-08-05 RX ORDER — ONDANSETRON 4 MG/1
4 TABLET, FILM COATED ORAL EVERY 8 HOURS PRN
Qty: 10 TABLET | Refills: 0 | Status: SHIPPED | OUTPATIENT
Start: 2024-08-05

## 2024-08-05 RX ORDER — ACETAMINOPHEN 500 MG
500 TABLET ORAL ONCE
Status: COMPLETED | OUTPATIENT
Start: 2024-08-05 | End: 2024-08-05

## 2024-08-05 RX ORDER — IBUPROFEN 600 MG/1
600 TABLET ORAL ONCE
Status: COMPLETED | OUTPATIENT
Start: 2024-08-05 | End: 2024-08-05

## 2024-08-05 RX ADMIN — ONDANSETRON 4 MG: 4 TABLET, ORALLY DISINTEGRATING ORAL at 16:35

## 2024-08-05 RX ADMIN — IBUPROFEN 600 MG: 600 TABLET ORAL at 16:34

## 2024-08-05 RX ADMIN — Medication 500 MG: at 16:34

## 2024-08-05 ASSESSMENT — PAIN SCALES - GENERAL: PAINLEVEL: 0

## 2024-08-05 ASSESSMENT — ENCOUNTER SYMPTOMS
FATIGUE: 1
HEADACHES: 1
CONSTIPATION: 0
NAUSEA: 1

## 2024-08-06 ENCOUNTER — PATIENT MESSAGE (OUTPATIENT)
Dept: FAMILY MEDICINE CLINIC | Facility: CLINIC | Age: 54
End: 2024-08-06

## 2024-08-06 ENCOUNTER — TELEPHONE (OUTPATIENT)
Dept: FAMILY MEDICINE CLINIC | Facility: CLINIC | Age: 54
End: 2024-08-06

## 2024-08-06 DIAGNOSIS — D64.9 SEVERE ANEMIA: Primary | ICD-10-CM

## 2024-08-06 NOTE — TELEPHONE ENCOUNTER
Patient called, verified Name and . States she was seen in the Urgent Care yesterday for nausea, intermittent dizziness and headache. She was told that she is anemic with hemoglobin of 8.9. She was given medication for nausea and was asked to get over-the-counter iron. Also to follow up with primary care provider for repeat CBC.     States she remains to have the same symptoms and wants to get an order for CBC. States she has a follow-up appointment scheduled on  but is requesting to speak with PCP today.     Future Appointments   Date Time Provider Department Center   2024  2:20 PM Dominik Edmond, DO [facility]     Dr. Edmond please advise.

## 2024-08-14 NOTE — TELEPHONE ENCOUNTER
Patient to receive a call on Wednesday, August 14, 2024.  Order for repeat CBC has been placed on the chart.  Thank you.

## 2024-08-14 NOTE — TELEPHONE ENCOUNTER
Spoke with patient and informed of repeat CBC.   Patient states that her symptoms have only minimally improved  Informed patient that this RN would send the message to Dr. Edmond to see if he could see her sooner then her scheduled appointment for 8/20 and patient politely declined.

## 2024-08-20 ENCOUNTER — NURSE ONLY (OUTPATIENT)
Dept: INTERNAL MEDICINE CLINIC | Facility: CLINIC | Age: 54
End: 2024-08-20

## 2024-08-20 ENCOUNTER — LAB ENCOUNTER (OUTPATIENT)
Dept: LAB | Age: 54
End: 2024-08-20
Attending: FAMILY MEDICINE
Payer: COMMERCIAL

## 2024-08-20 ENCOUNTER — OFFICE VISIT (OUTPATIENT)
Dept: FAMILY MEDICINE CLINIC | Facility: CLINIC | Age: 54
End: 2024-08-20

## 2024-08-20 VITALS
WEIGHT: 251 LBS | RESPIRATION RATE: 18 BRPM | OXYGEN SATURATION: 96 % | SYSTOLIC BLOOD PRESSURE: 105 MMHG | BODY MASS INDEX: 44 KG/M2 | HEART RATE: 93 BPM | DIASTOLIC BLOOD PRESSURE: 74 MMHG | TEMPERATURE: 98 F

## 2024-08-20 DIAGNOSIS — E11.9 TYPE 2 DIABETES MELLITUS WITHOUT COMPLICATION, WITHOUT LONG-TERM CURRENT USE OF INSULIN (HCC): ICD-10-CM

## 2024-08-20 DIAGNOSIS — R09.81 NASAL SINUS CONGESTION: ICD-10-CM

## 2024-08-20 DIAGNOSIS — R42 VERTIGO: ICD-10-CM

## 2024-08-20 DIAGNOSIS — E88.09 HYPERPROTEINEMIA: ICD-10-CM

## 2024-08-20 DIAGNOSIS — R77.9 ELEVATED SERUM PROTEIN LEVEL: ICD-10-CM

## 2024-08-20 DIAGNOSIS — Z28.21 VARICELLA ZOSTER VIRUS (VZV) VACCINATION DECLINED: ICD-10-CM

## 2024-08-20 DIAGNOSIS — R11.2 NAUSEA AND VOMITING, UNSPECIFIED VOMITING TYPE: ICD-10-CM

## 2024-08-20 DIAGNOSIS — I10 PRIMARY HYPERTENSION: ICD-10-CM

## 2024-08-20 DIAGNOSIS — Z28.21 TETANUS, DIPHTHERIA, AND ACELLULAR PERTUSSIS (TDAP) VACCINATION DECLINED: ICD-10-CM

## 2024-08-20 DIAGNOSIS — D64.9 ACUTE ANEMIA: ICD-10-CM

## 2024-08-20 DIAGNOSIS — D64.9 SEVERE ANEMIA: ICD-10-CM

## 2024-08-20 DIAGNOSIS — E11.9 TYPE 2 DIABETES MELLITUS WITHOUT COMPLICATION, WITHOUT LONG-TERM CURRENT USE OF INSULIN (HCC): Primary | ICD-10-CM

## 2024-08-20 DIAGNOSIS — J01.90 ACUTE RHINOSINUSITIS: ICD-10-CM

## 2024-08-20 LAB
BASOPHILS # BLD AUTO: 0.04 X10(3) UL (ref 0–0.2)
BASOPHILS NFR BLD AUTO: 0.6 %
DEPRECATED RDW RBC AUTO: 42.6 FL (ref 35.1–46.3)
EOSINOPHIL # BLD AUTO: 0.1 X10(3) UL (ref 0–0.7)
EOSINOPHIL NFR BLD AUTO: 1.4 %
ERYTHROCYTE [DISTWIDTH] IN BLOOD BY AUTOMATED COUNT: 13.1 % (ref 11–15)
HCT VFR BLD AUTO: 43.7 %
HEMOGLOBIN A1C: 7.6 % (ref 4.3–5.6)
HGB BLD-MCNC: 14.5 G/DL
IMM GRANULOCYTES # BLD AUTO: 0.01 X10(3) UL (ref 0–1)
IMM GRANULOCYTES NFR BLD: 0.1 %
LYMPHOCYTES # BLD AUTO: 2.69 X10(3) UL (ref 1–4)
LYMPHOCYTES NFR BLD AUTO: 38.8 %
MCH RBC QN AUTO: 29.5 PG (ref 26–34)
MCHC RBC AUTO-ENTMCNC: 33.2 G/DL (ref 31–37)
MCV RBC AUTO: 88.8 FL
MONOCYTES # BLD AUTO: 0.49 X10(3) UL (ref 0.1–1)
MONOCYTES NFR BLD AUTO: 7.1 %
NEUTROPHILS # BLD AUTO: 3.61 X10 (3) UL (ref 1.5–7.7)
NEUTROPHILS # BLD AUTO: 3.61 X10(3) UL (ref 1.5–7.7)
NEUTROPHILS NFR BLD AUTO: 52 %
PLATELET # BLD AUTO: 314 10(3)UL (ref 150–450)
RBC # BLD AUTO: 4.92 X10(6)UL
WBC # BLD AUTO: 6.9 X10(3) UL (ref 4–11)

## 2024-08-20 PROCEDURE — 84165 PROTEIN E-PHORESIS SERUM: CPT

## 2024-08-20 PROCEDURE — 36415 COLL VENOUS BLD VENIPUNCTURE: CPT

## 2024-08-20 PROCEDURE — 92229 IMG RTA DETC/MNTR DS POC ALY: CPT | Performed by: FAMILY MEDICINE

## 2024-08-20 PROCEDURE — 85025 COMPLETE CBC W/AUTO DIFF WBC: CPT

## 2024-08-20 PROCEDURE — 99214 OFFICE O/P EST MOD 30 MIN: CPT | Performed by: FAMILY MEDICINE

## 2024-08-20 PROCEDURE — 2033F EYE IMG VALID W/O RTNOPTHY: CPT | Performed by: FAMILY MEDICINE

## 2024-08-20 PROCEDURE — 3078F DIAST BP <80 MM HG: CPT | Performed by: FAMILY MEDICINE

## 2024-08-20 PROCEDURE — 3074F SYST BP LT 130 MM HG: CPT | Performed by: FAMILY MEDICINE

## 2024-08-20 PROCEDURE — 83036 HEMOGLOBIN GLYCOSYLATED A1C: CPT | Performed by: FAMILY MEDICINE

## 2024-08-20 PROCEDURE — 3051F HG A1C>EQUAL 7.0%<8.0%: CPT | Performed by: FAMILY MEDICINE

## 2024-08-20 RX ORDER — AZITHROMYCIN 250 MG/1
TABLET, FILM COATED ORAL
Qty: 6 TABLET | Refills: 0 | Status: SHIPPED | OUTPATIENT
Start: 2024-08-20 | End: 2024-08-24

## 2024-08-20 NOTE — PATIENT INSTRUCTIONS
Repeat CBC on today.  Patient encouraged to continue with daily iron.  Patient also encouraged to continue with clear water hydration.  We can get iron studies off of the patient's sample today for the CBC if she still shows an anemic pattern.  A1c and hemoglobin electrophoresis also ordered.

## 2024-08-20 NOTE — PROGRESS NOTES
Subjective:     Patient ID: Adriana Hernandez is a 54 year old female.    This patient is a well-established 54-year-old hypertensive/morbidly obese by definition/diabetic -American female who was doing a follow-up after experiencing at least 2 bouts of vertigo followed by nausea and vomiting over the last 2 to 3 weeks.  Ultimately, the patient went to urgent care and was found to have hemoglobin of 8.9.  The patient just had a hemoglobin over 14 from October 2023.  Patient had labs done earlier in this calendar year and I did recommend that the patient have a hemoglobin electrophoresis for protein as her protein levels were elevated.  This may or may not be related to the patient's complaint and acute onset anemia without obvious GI or reproductive organ bleed.    Patient also has nasal sinus congestion with headache and has a past history of being prone to sinus infections.  Again, this may be related to the patient's dizziness complaint.  We will evaluate her and see if there is a reoccurring infection.        History/Other:   Review of Systems  Current Outpatient Medications   Medication Sig Dispense Refill    azithromycin (ZITHROMAX Z-MARISELA) 250 MG Oral Tab Take 2 tablets (500 mg total) by mouth daily for 1 day, THEN 1 tablet (250 mg total) daily for 4 days. 6 tablet 0    amLODIPine 2.5 MG Oral Tab Take 1 tablet (2.5 mg total) by mouth daily. 90 tablet 3    losartan-hydroCHLOROthiazide 100-25 MG Oral Tab Take 1 tablet by mouth daily. 90 tablet 3    ibuprofen 600 MG Oral Tab Take 1 tablet (600 mg total) by mouth every 8 (eight) hours. 30 tablet 0    cyclobenzaprine 10 MG Oral Tab Take 1 tablet (10 mg total) by mouth 3 (three) times daily as needed for Muscle spasms. 270 tablet 0    fluticasone propionate 50 MCG/ACT Nasal Suspension 2 sprays by Nasal route daily. 48 g 3    simvastatin 20 MG Oral Tab Take 1 tablet (20 mg total) by mouth nightly. (Patient not taking: Reported on 8/20/2024) 90 tablet 1     fexofenadine-pseudoephedrine ER  MG Oral Tablet 12 Hr Take 1 tablet by mouth 2 (two) times daily. (Patient not taking: Reported on 1/8/2024) 30 tablet 0    HYOSCYAMINE SULFATE 0.125 MG Oral Tablet Dispersible PLACE 1 TABLET (0.125 MG TOTAL) UNDER THE TONGUE EVERY 4 (FOUR) HOURS AS NEEDED. 60 tablet 0    hydrochlorothiazide 25 MG Oral Tab Take 1 tablet (25 mg total) by mouth daily. (Patient not taking: Reported on 8/20/2024) 90 tablet 1    SYNJARDY XR 12.5-1000 MG Oral Tablet 24 Hr TAKE 1 TABLET WITH BREAKFAST TWICE A DAY ORALLY      LORazepam 0.5 MG Oral Tab Take 1 tablet (0.5 mg total) by mouth 2 (two) times daily as needed for Anxiety. (Patient not taking: Reported on 1/8/2024) 60 tablet 0    Zolpidem Tartrate 10 MG Oral Tab Take 1 tablet (10 mg total) by mouth nightly as needed for Sleep. (Patient not taking: Reported on 10/6/2023) 30 tablet 1    Glucose Blood (ONETOUCH VERIO) In Vitro Strip Use to test blood glucose level 1 time per day 100 each 0    ONETOUCH DELICA LANCETS 33G Does not apply Misc Use to test blood glucose level daily (Patient not taking: Reported on 8/20/2024) 100 each 0     Allergies:  Allergies   Allergen Reactions    Cherry     Cherry OTHER (SEE COMMENTS)       Past Medical History:    Anxiety state, unspecified    Diabetes (HCC)    High blood pressure    Unspecified essential hypertension      Past Surgical History:   Procedure Laterality Date    Electrocardiogram, complete  5/12/2012    scanned to media tab      Family History   Problem Relation Age of Onset    Hypertension Mother     Hypertension Father     Diabetes Father     Lipids Father         hyperlipidemia    Glaucoma Father     Cancer Maternal Grandfather         lung    Ovarian Cancer Paternal Grandmother         70's      Social History:   Social History     Socioeconomic History    Marital status: Single   Tobacco Use    Smoking status: Never    Smokeless tobacco: Never   Vaping Use    Vaping status: Never Used    Substance and Sexual Activity    Alcohol use: Yes     Comment: beer & liquor, 2 glasses occasionally    Drug use: No   Other Topics Concern    Caffeine Concern Yes     Comment: coffee, soda    Left Handed No    Right Handed Yes    Currently spends a great deal of time in the sun No    History of tanning No    Hx of Spending Great Deal of Time in Sun No    Bad sunburns in the past No    Tanning Salons in the Past No    Hx of Radiation Treatments No        Objective:   Vitals:    08/20/24 1429   BP: 105/74   Pulse: 93   Resp: 18   Temp: 98.2 °F (36.8 °C)       Physical Exam  Constitutional:       General: She is in acute distress.      Appearance: She is obese. She is not ill-appearing.   HENT:      Head: Atraumatic.      Right Ear: Tympanic membrane is retracted.      Left Ear: Tympanic membrane is retracted.      Nose: Mucosal edema and congestion present.      Right Turbinates: Swollen.      Left Turbinates: Swollen.      Right Sinus: Maxillary sinus tenderness present.      Left Sinus: Maxillary sinus tenderness present.   Neck:      Thyroid: No thyromegaly.   Cardiovascular:      Rate and Rhythm: Normal rate and regular rhythm.      Heart sounds:      No gallop.   Pulmonary:      Effort: Pulmonary effort is normal.      Breath sounds: Normal breath sounds.   Neurological:      Mental Status: She is alert and oriented to person, place, and time.   Psychiatric:         Mood and Affect: Affect is flat.         Assessment & Plan:   1. Acute anemia  Recheck CBC on today.  Patient needs OB/GYN he update to rule out reproductive organ origin for bleeding.  - OBG Referral - [place] ([facility])  - CBC W Differential W Platelet [E]; Future    2. Vertigo  May be secondary to acute onset anemia combined with acute rhinosinusitis (see #3.    3. Acute rhinosinusitis  Prescribed.  - azithromycin (ZITHROMAX Z-MARISELA) 250 MG Oral Tab; Take 2 tablets (500 mg total) by mouth daily for 1 day, THEN 1 tablet (250 mg total)  daily for 4 days.  Dispense: 6 tablet; Refill: 0    4. Nasal sinus congestion  See #4.    5. Nausea and vomiting, unspecified vomiting type  Expected result from #2.    6. Primary hypertension  Measure is lower than usual.  This may be secondary to decreased volume secondary to decreased hemoglobin.    7. Type 2 diabetes mellitus without complication, without long-term current use of insulin ([facility])  Diabetic status update.  A1c is 7.6.  This may be false as the patient is last recorded hemoglobin was 8.9.  CBC pending on today.  Retinopathy testing on today.  - POC Glycohemoglobin [54492]  - Ophthalmology Referral - In Network  - Diabetic Retinopathy Exam  OU - Both Eyes; Future  - Diabetic Test Strips and Supplies    8. Tetanus, diphtheria, and acellular pertussis (Tdap) vaccination declined  No immunizations to be given on today as patient does not clinically feel herself and she is not found to be well via exam.    9. Varicella zoster virus (VZV) vaccination declined  Declined until another date secondary to medical acute illness.    10. Elevated serum protein level  Ordered and to be drawn today.  - Serum Protein Electrophoresis [E]; Future      Orders Placed This Encounter   Procedures    POC Glycohemoglobin [16662]    Serum Protein Electrophoresis [E]    CBC W Differential W Platelet [E]    Diabetic Retinopathy Exam  OU - Both Eyes       Meds This Visit:  Requested Prescriptions     Signed Prescriptions Disp Refills    azithromycin (ZITHROMAX Z-MARISELA) 250 MG Oral Tab 6 tablet 0     Sig: Take 2 tablets (500 mg total) by mouth daily for 1 day, THEN 1 tablet (250 mg total) daily for 4 days.       Imaging & Referrals:  OPHTHALMOLOGY - INTERNAL  OBG - INTERNAL  DME DIABETIC TEST STRIPS AND SUPPLIES     Patient Instructions   Repeat CBC on today.  Patient encouraged to continue with daily iron.  Patient also encouraged to continue with clear water hydration.  We can get iron studies off of the patient's sample today  for the CBC if she still shows an anemic pattern.  A1c and hemoglobin electrophoresis also ordered.     Return if symptoms worsen or fail to improve.

## 2024-08-22 LAB
ALBUMIN SERPL ELPH-MCNC: 3.96 G/DL (ref 3.75–5.21)
ALBUMIN/GLOB SERPL: 0.93 {RATIO} (ref 1–2)
ALPHA1 GLOB SERPL ELPH-MCNC: 0.35 G/DL (ref 0.19–0.46)
ALPHA2 GLOB SERPL ELPH-MCNC: 0.94 G/DL (ref 0.48–1.05)
B-GLOBULIN SERPL ELPH-MCNC: 1.19 G/DL (ref 0.68–1.23)
GAMMA GLOB SERPL ELPH-MCNC: 1.75 G/DL (ref 0.62–1.7)
PROT SERPL-MCNC: 8.2 G/DL (ref 5.7–8.2)

## 2024-09-01 DIAGNOSIS — E88.09 HYPERPROTEINEMIA: Primary | ICD-10-CM

## 2024-10-25 DIAGNOSIS — Z12.11 SCREENING FOR COLON CANCER: Primary | ICD-10-CM

## 2024-11-18 ENCOUNTER — OFFICE VISIT (OUTPATIENT)
Dept: FAMILY MEDICINE CLINIC | Facility: CLINIC | Age: 54
End: 2024-11-18
Payer: COMMERCIAL

## 2024-11-18 VITALS
SYSTOLIC BLOOD PRESSURE: 110 MMHG | OXYGEN SATURATION: 98 % | DIASTOLIC BLOOD PRESSURE: 88 MMHG | WEIGHT: 252 LBS | HEART RATE: 105 BPM | BODY MASS INDEX: 45 KG/M2

## 2024-11-18 DIAGNOSIS — M25.539 PAIN IN WRIST, UNSPECIFIED LATERALITY: ICD-10-CM

## 2024-11-18 DIAGNOSIS — G89.29 CHRONIC MIDLINE LOW BACK PAIN WITHOUT SCIATICA: ICD-10-CM

## 2024-11-18 DIAGNOSIS — J01.90 ACUTE RHINOSINUSITIS: Primary | ICD-10-CM

## 2024-11-18 DIAGNOSIS — E66.01 CLASS 3 SEVERE OBESITY WITH SERIOUS COMORBIDITY AND BODY MASS INDEX (BMI) OF 40.0 TO 44.9 IN ADULT, UNSPECIFIED OBESITY TYPE (HCC): ICD-10-CM

## 2024-11-18 DIAGNOSIS — M54.50 CHRONIC MIDLINE LOW BACK PAIN WITHOUT SCIATICA: ICD-10-CM

## 2024-11-18 DIAGNOSIS — R09.81 NASAL SINUS CONGESTION: ICD-10-CM

## 2024-11-18 DIAGNOSIS — I10 ESSENTIAL HYPERTENSION: ICD-10-CM

## 2024-11-18 DIAGNOSIS — R42 DIZZINESS: ICD-10-CM

## 2024-11-18 DIAGNOSIS — E66.813 CLASS 3 SEVERE OBESITY WITH SERIOUS COMORBIDITY AND BODY MASS INDEX (BMI) OF 40.0 TO 44.9 IN ADULT, UNSPECIFIED OBESITY TYPE (HCC): ICD-10-CM

## 2024-11-18 DIAGNOSIS — E11.9 TYPE 2 DIABETES MELLITUS WITHOUT COMPLICATION, WITHOUT LONG-TERM CURRENT USE OF INSULIN (HCC): ICD-10-CM

## 2024-11-18 LAB
ALBUMIN SERPL-MCNC: 4.7 G/DL (ref 3.2–4.8)
ALBUMIN/GLOB SERPL: 1.1 {RATIO} (ref 1–2)
ALP LIVER SERPL-CCNC: 87 U/L
ALT SERPL-CCNC: 11 U/L
ANION GAP SERPL CALC-SCNC: 12 MMOL/L (ref 0–18)
AST SERPL-CCNC: 16 U/L (ref ?–34)
BILIRUB SERPL-MCNC: 0.4 MG/DL (ref 0.3–1.2)
BUN BLD-MCNC: 9 MG/DL (ref 9–23)
BUN/CREAT SERPL: 11 (ref 10–20)
CALCIUM BLD-MCNC: 10.3 MG/DL (ref 8.7–10.4)
CHLORIDE SERPL-SCNC: 103 MMOL/L (ref 98–112)
CO2 SERPL-SCNC: 24 MMOL/L (ref 21–32)
CREAT BLD-MCNC: 0.82 MG/DL
CREAT UR-SCNC: 77.5 MG/DL
EGFRCR SERPLBLD CKD-EPI 2021: 84 ML/MIN/1.73M2 (ref 60–?)
FASTING STATUS PATIENT QL REPORTED: NO
GLOBULIN PLAS-MCNC: 4.1 G/DL (ref 2–3.5)
GLUCOSE BLD-MCNC: 149 MG/DL (ref 70–99)
MICROALBUMIN UR-MCNC: <0.3 MG/DL
OSMOLALITY SERPL CALC.SUM OF ELEC: 289 MOSM/KG (ref 275–295)
POTASSIUM SERPL-SCNC: 3.6 MMOL/L (ref 3.5–5.1)
PROT SERPL-MCNC: 8.8 G/DL (ref 5.7–8.2)
SODIUM SERPL-SCNC: 139 MMOL/L (ref 136–145)

## 2024-11-18 PROCEDURE — 3074F SYST BP LT 130 MM HG: CPT | Performed by: FAMILY MEDICINE

## 2024-11-18 PROCEDURE — 3061F NEG MICROALBUMINURIA REV: CPT | Performed by: FAMILY MEDICINE

## 2024-11-18 PROCEDURE — 99214 OFFICE O/P EST MOD 30 MIN: CPT | Performed by: FAMILY MEDICINE

## 2024-11-18 PROCEDURE — 3079F DIAST BP 80-89 MM HG: CPT | Performed by: FAMILY MEDICINE

## 2024-11-18 RX ORDER — LOSARTAN POTASSIUM AND HYDROCHLOROTHIAZIDE 25; 100 MG/1; MG/1
1 TABLET ORAL DAILY
Qty: 90 TABLET | Refills: 3 | Status: SHIPPED | OUTPATIENT
Start: 2024-11-18

## 2024-11-18 RX ORDER — IBUPROFEN 600 MG/1
600 TABLET, FILM COATED ORAL EVERY 8 HOURS
Qty: 30 TABLET | Refills: 0 | Status: SHIPPED | OUTPATIENT
Start: 2024-11-18

## 2024-11-18 RX ORDER — AMLODIPINE BESYLATE 2.5 MG/1
2.5 TABLET ORAL DAILY
Qty: 90 TABLET | Refills: 3 | Status: SHIPPED | OUTPATIENT
Start: 2024-11-18

## 2024-11-18 RX ORDER — AZITHROMYCIN 250 MG/1
TABLET, FILM COATED ORAL
Qty: 6 TABLET | Refills: 0 | Status: SHIPPED | OUTPATIENT
Start: 2024-11-18 | End: 2024-11-23

## 2024-11-18 NOTE — PATIENT INSTRUCTIONS
Medication reviewed and renewed where needed and appropriate.  Recommend weight loss via daily exercising and consistent healthy dietary changes.  Encouraged physical fitness and daily physical activity daily.  Monitor blood pressures and record at home. Limit salt intake.  Comply with medications.  Referred to ENT for sinus evaluation.  Continue with Allegra D 24 as needed.  Diabetic updates on today with GFR and microalbumin assessment.  No vaccines today per the patient.  Referred to gyne for pap (patient to try to schedule again).  Zithromax prescribed.

## 2024-11-24 NOTE — PROGRESS NOTES
Subjective:     Patient ID: Adriana Hernandez is a 54 year old female.    This patient is a well-established 54-year-old hypertensive/morbidly obese by definition -American female who has unfortunately suffered a reoccurrence of dizziness who has been symptom-free for some time, but is beginning to experience light dizziness without vertigo over the last several weeks.  Patient denies headaches, chest pain, exertional fatigue, acute visual changes, or shortness of breath.    Patient had an examination which demonstrated remarkable turbinate congestion and also tender sinuses in 2 regions including the frontal and maxillary sinuses.  Once again her spells of dizziness could be attributed to her sinus congestion/infection.    Patient has been reminded that she needs some form of colon cancer screening.    Patient declines on recommended seasonal flu vaccine and also Tdap and shingles vaccines.    Patient complains of a nontrauma induced wrist discomfort, but she is able to continue to do her domestic and work related duties.  Patient will keep tabs on the progress of this complaint and let us know and we will direct our investigation accordingly.        History/Other:   Review of Systems  Current Outpatient Medications   Medication Sig Dispense Refill    azithromycin (ZITHROMAX Z-MARISELA) 250 MG Oral Tab Take 2 tablets (500 mg total) by mouth daily for 1 day, THEN 1 tablet (250 mg total) daily for 4 days. 6 tablet 0    ibuprofen 600 MG Oral Tab Take 1 tablet (600 mg total) by mouth every 8 (eight) hours. 30 tablet 0    losartan-hydroCHLOROthiazide 100-25 MG Oral Tab Take 1 tablet by mouth daily. 90 tablet 3    amLODIPine 2.5 MG Oral Tab Take 1 tablet (2.5 mg total) by mouth daily. 90 tablet 3    fluticasone propionate 50 MCG/ACT Nasal Suspension 2 sprays by Nasal route daily. 48 g 3    simvastatin 20 MG Oral Tab Take 1 tablet (20 mg total) by mouth nightly. 90 tablet 1    fexofenadine-pseudoephedrine ER  MG  Oral Tablet 12 Hr Take 1 tablet by mouth 2 (two) times daily. 30 tablet 0    HYOSCYAMINE SULFATE 0.125 MG Oral Tablet Dispersible PLACE 1 TABLET (0.125 MG TOTAL) UNDER THE TONGUE EVERY 4 (FOUR) HOURS AS NEEDED. 60 tablet 0    cyclobenzaprine 10 MG Oral Tab Take 1 tablet (10 mg total) by mouth 3 (three) times daily as needed for Muscle spasms. 270 tablet 0    hydrochlorothiazide 25 MG Oral Tab Take 1 tablet (25 mg total) by mouth daily. 90 tablet 1    SYNJARDY XR 12.5-1000 MG Oral Tablet 24 Hr TAKE 1 TABLET WITH BREAKFAST TWICE A DAY ORALLY      LORazepam 0.5 MG Oral Tab Take 1 tablet (0.5 mg total) by mouth 2 (two) times daily as needed for Anxiety. 60 tablet 0    Zolpidem Tartrate 10 MG Oral Tab Take 1 tablet (10 mg total) by mouth nightly as needed for Sleep. 30 tablet 1    Glucose Blood (ONETOUCH VERIO) In Vitro Strip Use to test blood glucose level 1 time per day 100 each 0    ONETOUCH DELICA LANCETS 33G Does not apply Misc Use to test blood glucose level daily 100 each 0     Allergies:Allergies[1]    Past Medical History:    Anxiety state, unspecified    Diabetes (HCC)    High blood pressure    Unspecified essential hypertension      Past Surgical History:   Procedure Laterality Date    Electrocardiogram, complete  5/12/2012    scanned to media tab      Family History   Problem Relation Age of Onset    Hypertension Mother     Hypertension Father     Diabetes Father     Lipids Father         hyperlipidemia    Glaucoma Father     Cancer Maternal Grandfather         lung    Ovarian Cancer Paternal Grandmother         70's      Social History:   Social History     Socioeconomic History    Marital status: Single   Tobacco Use    Smoking status: Never    Smokeless tobacco: Never   Vaping Use    Vaping status: Never Used   Substance and Sexual Activity    Alcohol use: Yes     Comment: beer & liquor, 2 glasses occasionally    Drug use: No   Other Topics Concern    Caffeine Concern Yes     Comment: coffee, soda    Left  Handed No    Right Handed Yes    Currently spends a great deal of time in the sun No    History of tanning No    Hx of Spending Great Deal of Time in Sun No    Bad sunburns in the past No    Tanning Salons in the Past No    Hx of Radiation Treatments No        Objective:   Vitals:    11/18/24 1603   BP: 110/88   Pulse: 105       Physical Exam  Constitutional:       General: She is not in acute distress.     Appearance: She is obese. She is not ill-appearing.   HENT:      Head: Normocephalic and atraumatic.      Right Ear: Tympanic membrane normal.      Left Ear: Tympanic membrane normal.      Nose: Congestion present.      Right Sinus: Maxillary sinus tenderness and frontal sinus tenderness present.      Left Sinus: Maxillary sinus tenderness and frontal sinus tenderness present.   Cardiovascular:      Rate and Rhythm: Normal rate and regular rhythm.      Heart sounds:      No gallop.   Pulmonary:      Effort: Pulmonary effort is normal.      Breath sounds: Normal breath sounds.   Neurological:      Mental Status: She is alert.         Assessment & Plan:   1. Acute rhinosinusitis  Antibiotic has been prescribed.  Patient also being prescribed Allegra-D 12-hour for congestion  - azithromycin (ZITHROMAX Z-MARISELA) 250 MG Oral Tab; Take 2 tablets (500 mg total) by mouth daily for 1 day, THEN 1 tablet (250 mg total) daily for 4 days.  Dispense: 6 tablet; Refill: 0    2. Dizziness  Likely secondary to sinus infection and sinus congestion.    3. Nasal sinus congestion  Because of the reoccurrence of this patient's sinus challenges, she is being referred to ENT.  - ENT Referral - [place] ([place])    4. Type 2 diabetes mellitus without complication, without long-term current use of insulin (HCC)  Patient needs diabetic lab update and also test strips.  - Microalb/Creat Ratio, Random Urine [E]; Future  - Comp Metabolic Panel (14) [E]; Future  - Diabetic Test Strips and Supplies  - Microalb/Creat Ratio, Random Urine [E]  -  Comp Metabolic Panel (14) [E]    5. Pain in wrist, unspecified laterality  Patient will monitor complaint from wrist which is nontrauma induced.  If pain persists we will likely refer to our hand specialist.    6. Essential hypertension  Blood pressure measures to goal.  Compliance emphasized.  Patient was out of her amlodipine and needs that refilled.  Monitor salt intake.  Safe exercise 150 minutes/week recommended.  - losartan-hydroCHLOROthiazide 100-25 MG Oral Tab; Take 1 tablet by mouth daily.  Dispense: 90 tablet; Refill: 3  - amLODIPine 2.5 MG Oral Tab; Take 1 tablet (2.5 mg total) by mouth daily.  Dispense: 90 tablet; Refill: 3    7. Chronic midline low back pain without sciatica  Refilled.  - ibuprofen 600 MG Oral Tab; Take 1 tablet (600 mg total) by mouth every 8 (eight) hours.  Dispense: 30 tablet; Refill: 0    8. Class 3 severe obesity with serious comorbidity and body mass index (BMI) of 40.0 to 44.9 in adult, unspecified obesity type (HCC)  Recommend weight loss via daily exercising and consistent healthy dietary changes.        Orders Placed This Encounter   Procedures    Microalb/Creat Ratio, Random Urine [E]    Comp Metabolic Panel (14) [E]       Meds This Visit:  Requested Prescriptions     Signed Prescriptions Disp Refills    azithromycin (ZITHROMAX Z-MARISELA) 250 MG Oral Tab 6 tablet 0     Sig: Take 2 tablets (500 mg total) by mouth daily for 1 day, THEN 1 tablet (250 mg total) daily for 4 days.    ibuprofen 600 MG Oral Tab 30 tablet 0     Sig: Take 1 tablet (600 mg total) by mouth every 8 (eight) hours.    losartan-hydroCHLOROthiazide 100-25 MG Oral Tab 90 tablet 3     Sig: Take 1 tablet by mouth daily.    amLODIPine 2.5 MG Oral Tab 90 tablet 3     Sig: Take 1 tablet (2.5 mg total) by mouth daily.       Imaging & Referrals:  ENT - INTERNAL  DME DIABETIC TEST STRIPS AND SUPPLIES     Patient Instructions   Medication reviewed and renewed where needed and appropriate.  Recommend weight loss via daily  exercising and consistent healthy dietary changes.  Encouraged physical fitness and daily physical activity daily.  Monitor blood pressures and record at home. Limit salt intake.  Comply with medications.  Referred to ENT for sinus evaluation.  Continue with Allegra D 24 as needed.  Diabetic updates on today with GFR and microalbumin assessment.  No vaccines today per the patient.  Referred to [facility] for pap (patient to try to schedule again).  Zithromax prescribed.      Return if symptoms worsen or fail to improve.         [1]   Allergies  Allergen Reactions    Cherry     Cherry OTHER (SEE COMMENTS)

## 2024-12-08 DIAGNOSIS — E88.09 HYPERPROTEINEMIA: Primary | ICD-10-CM

## 2024-12-08 DIAGNOSIS — R73.9 ELEVATED RANDOM BLOOD GLUCOSE LEVEL: ICD-10-CM

## 2024-12-20 ENCOUNTER — OFFICE VISIT (OUTPATIENT)
Dept: OTOLARYNGOLOGY | Facility: CLINIC | Age: 54
End: 2024-12-20

## 2024-12-20 DIAGNOSIS — R09.82 POSTNASAL DRIP: ICD-10-CM

## 2024-12-20 DIAGNOSIS — J34.2 NASAL SEPTAL DEVIATION: ICD-10-CM

## 2024-12-20 DIAGNOSIS — J01.81 OTHER ACUTE RECURRENT SINUSITIS: Primary | ICD-10-CM

## 2024-12-21 NOTE — PROGRESS NOTES
Adriana Hernandez is a 54 year old female.   Chief Complaint   Patient presents with    Sinus Problem     C/o sinus pain and congestion.      HPI:   Patient here with sinus congestion and pressure.  He has been on multiple rounds of antibiotic therapy and steroids.  However, symptoms are recurrent.    Current Outpatient Medications   Medication Sig Dispense Refill    ibuprofen 600 MG Oral Tab Take 1 tablet (600 mg total) by mouth every 8 (eight) hours. 30 tablet 0    losartan-hydroCHLOROthiazide 100-25 MG Oral Tab Take 1 tablet by mouth daily. 90 tablet 3    amLODIPine 2.5 MG Oral Tab Take 1 tablet (2.5 mg total) by mouth daily. 90 tablet 3    fluticasone propionate 50 MCG/ACT Nasal Suspension 2 sprays by Nasal route daily. 48 g 3    simvastatin 20 MG Oral Tab Take 1 tablet (20 mg total) by mouth nightly. 90 tablet 1    fexofenadine-pseudoephedrine ER  MG Oral Tablet 12 Hr Take 1 tablet by mouth 2 (two) times daily. 30 tablet 0    HYOSCYAMINE SULFATE 0.125 MG Oral Tablet Dispersible PLACE 1 TABLET (0.125 MG TOTAL) UNDER THE TONGUE EVERY 4 (FOUR) HOURS AS NEEDED. 60 tablet 0    cyclobenzaprine 10 MG Oral Tab Take 1 tablet (10 mg total) by mouth 3 (three) times daily as needed for Muscle spasms. 270 tablet 0    hydrochlorothiazide 25 MG Oral Tab Take 1 tablet (25 mg total) by mouth daily. 90 tablet 1    SYNJARDY XR 12.5-1000 MG Oral Tablet 24 Hr TAKE 1 TABLET WITH BREAKFAST TWICE A DAY ORALLY      LORazepam 0.5 MG Oral Tab Take 1 tablet (0.5 mg total) by mouth 2 (two) times daily as needed for Anxiety. 60 tablet 0    Zolpidem Tartrate 10 MG Oral Tab Take 1 tablet (10 mg total) by mouth nightly as needed for Sleep. 30 tablet 1    Glucose Blood (ONETOUCH VERIO) In Vitro Strip Use to test blood glucose level 1 time per day 100 each 0    ONETOUCH DELICA LANCETS 33G Does not apply Misc Use to test blood glucose level daily 100 each 0      Past Medical History:    Anxiety state, unspecified    Diabetes (HCC)    High  blood pressure    Unspecified essential hypertension      Social History:  Social History     Socioeconomic History    Marital status: Single   Tobacco Use    Smoking status: Never    Smokeless tobacco: Never   Vaping Use    Vaping status: Never Used   Substance and Sexual Activity    Alcohol use: Yes     Comment: beer & liquor, 2 glasses occasionally    Drug use: No   Other Topics Concern    Caffeine Concern Yes     Comment: coffee, soda    Left Handed No    Right Handed Yes    Currently spends a great deal of time in the sun No    History of tanning No    Hx of Spending Great Deal of Time in Sun No    Bad sunburns in the past No    Tanning Salons in the Past No    Hx of Radiation Treatments No        REVIEW OF SYSTEMS:   GENERAL HEALTH: feels well otherwise  GENERAL : denies fever, chills, sweats, weight loss, weight gain  SKIN: denies any unusual skin lesions or rashes  RESPIRATORY: denies shortness of breath with exertion  NEURO: denies headaches    EXAM:   LMP  (LMP Unknown)   System Details   Skin Inspection - Normal.   Constitutional Overall appearance - Normal.   Head/Face Facial features - Normal. Eyebrows - Normal. Skull - Normal.   Eyes Conjunctiva - Right: Normal, Left: Normal. Pupil - Right: Normal, Left: Normal.    Ears Inspection - Right: Normal, Left: Normal.   Canal - Right: Normal, Left: Normal.    TM - Right: Normal, Left: Normal.   Nasal External nose - Normal.   Consent was obtained.  The nasal cavity was anesthetized with 1% neosynephrine and 4% lidocaine.  The bilateral nares were examined from the nasal vestibule to the nasopharynx.  Areas examined include the nasal floor, turbinates, superior, middle, and inferior meatus, the nasal septum, sphenoethmoid recess, eustation tube and nasopharynx.  All abnormalities are listed in the exam section.  Left septal deviation.  Mucoid postnasal drainage.  No purulence or polyps noted.   Oral/Oropharynx Lips - Normal, Tonsils - Normal, Tongue - Normal     Neck Exam Inspection - Normal. Palpation - Normal. Parotid gland - Normal. Thyroid gland - Normal.   Lymph Detail Submental. Submandibular. Anterior cervical. Posterior cervical. Supraclavicular.  All without enlargement   Psychiatric Orientation - Oriented to time, place, person & situation. Appropriate mood and affect.   Neurological Memory - Normal. Cranial nerves - Cranial nerves II through XII grossly intact.   Nasopharynx Normal by fiberoptic exam.     ASSESSMENT AND PLAN:   1. Other acute recurrent sinusitis  A CT scan of the sinuses was ordered as patient has been on multiple rounds of antibiotic therapy as well as steroids without resolution of her symptoms.  I will of course notify her of the results.  - CT SINUS (CPT=70486); Future    2. Nasal septal deviation  The left.    3. Postnasal drip  Can continue Flonase nasal spray for the drainage.      The patient indicates understanding of these issues and agrees to the plan.      Shirley Childers MD  12/20/2024  10:49 PM

## 2024-12-21 NOTE — PATIENT INSTRUCTIONS
A CT scan of the sinuses was ordered as you have been on multiple rounds of antibiotic and steroid treatment.  On fiberoptic scope you had mucoid postnasal drainage and a left septal deviation.  I will of course notify you of the results of the CT scan.  Can continue Flonase nasal spray for the drainage.

## 2025-06-20 ENCOUNTER — TELEMEDICINE (OUTPATIENT)
Dept: FAMILY MEDICINE CLINIC | Facility: CLINIC | Age: 55
End: 2025-06-20
Payer: COMMERCIAL

## 2025-06-20 DIAGNOSIS — E11.9 TYPE 2 DIABETES MELLITUS WITHOUT COMPLICATION, WITHOUT LONG-TERM CURRENT USE OF INSULIN (HCC): ICD-10-CM

## 2025-06-20 DIAGNOSIS — G44.209 MUSCLE TENSION HEADACHE: Primary | ICD-10-CM

## 2025-06-20 DIAGNOSIS — I10 ESSENTIAL HYPERTENSION: ICD-10-CM

## 2025-06-20 DIAGNOSIS — K58.9 IRRITABLE BOWEL SYNDROME, UNSPECIFIED TYPE: ICD-10-CM

## 2025-06-20 DIAGNOSIS — F43.23 ADJUSTMENT REACTION WITH ANXIETY AND DEPRESSION: ICD-10-CM

## 2025-06-20 PROCEDURE — 99214 OFFICE O/P EST MOD 30 MIN: CPT | Performed by: FAMILY MEDICINE

## 2025-06-20 RX ORDER — LORAZEPAM 0.5 MG/1
0.5 TABLET ORAL 2 TIMES DAILY PRN
Qty: 60 TABLET | Refills: 0 | Status: SHIPPED | OUTPATIENT
Start: 2025-06-20

## 2025-06-20 RX ORDER — METHYLPREDNISOLONE 4 MG/1
TABLET ORAL
Qty: 1 EACH | Refills: 0 | Status: SHIPPED | OUTPATIENT
Start: 2025-06-20

## 2025-06-20 RX ORDER — CYCLOBENZAPRINE HCL 5 MG
5 TABLET ORAL NIGHTLY
Qty: 30 TABLET | Refills: 0 | Status: SHIPPED | OUTPATIENT
Start: 2025-06-20

## 2025-06-20 RX ORDER — HYOSCYAMINE SULFATE 0.125 MG
0.12 TABLET,DISINTEGRATING ORAL EVERY 4 HOURS PRN
Qty: 60 TABLET | Refills: 0 | Status: SHIPPED | OUTPATIENT
Start: 2025-06-20

## 2025-06-20 NOTE — PATIENT INSTRUCTIONS
Cyclobenzaprine 5 mg prescribed to be taken twice daily as needed for muscle spasm.  Medrol Dosepak also prescribed.  Lorazepam 0.5 mg orally twice daily for anxiety related symptoms.  Patient has IBS flare and needs hyoscyamine refilled.  Increase clear water intake.  Continue with mental health counselor.  Patient encouraged to reach back out to the clinic for lab follow-up.  Patient will likely benefit from osteopathic manipulation regarding her occipital/cervical discomfort/tension.

## 2025-06-20 NOTE — PROGRESS NOTES
Subjective:     Patient ID: Adriana Hernandez is a 54 year old female.    This patient is a 54-year-old well-established hypertensive/diabetic -American female who consents to this virtual video visit who unfortunately has been under a lot of stress regarding the threat of being laid off from her job.  She is experiencing tension that affects the back of her head and her cervical region.  Patient's irritable bowel symptoms are exacerbating.  Patient has diminished appetite and also challenged sleep patterns.    Patient is currently seeing a counselor for anxiety and depression.    Patient is not visible on his virtual video visit as her camera is not working, but she is able to be heard.        History/Other:   Review of Systems  Current Medications[1]  Allergies:Allergies[2]    Past Medical History[3]   Past Surgical History[4]   Family History[5]   Social History: Short Social Hx on File[6]     Objective:   There were no vitals filed for this visit.    Physical Exam  Neurological:      Mental Status: She is alert and oriented to person, place, and time.         Assessment & Plan:   1. Muscle tension headache  The following has been prescribed.  See patient instructions.  - cyclobenzaprine 5 MG Oral Tab; Take 1 tablet (5 mg total) by mouth nightly.  Dispense: 30 tablet; Refill: 0  - methylPREDNISolone (MEDROL) 4 MG Oral Tablet Therapy Pack; As directed.  Dispense: 1 each; Refill: 0    2. Irritable bowel syndrome, unspecified type  The following has been refilled.  - hyoscyamine sulfate 0.125 MG Oral Tablet Dispersible; Place 1 tablet (0.125 mg total) under the tongue every 4 (four) hours as needed.  Dispense: 60 tablet; Refill: 0    3. Adjustment reaction with anxiety and depression  The following has been refilled.  - LORazepam 0.5 MG Oral Tab; Take 1 tablet (0.5 mg total) by mouth 2 (two) times daily as needed for Anxiety.  Dispense: 60 tablet; Refill: 0    4. Essential hypertension  Unable to measure on  today.  Compliance with medication has been emphasized and encouraged.  Minimize salt intake.  If able safe exercise at 150 minutes/week recommended.    5. Type 2 diabetes mellitus without complication, without long-term current use of insulin ([facility])  Last A1c done in April 2025 at 7.4.  Decent control.  Patient encouraged to discipline her dietary intake and increase her physical activity.      No orders of the defined types were placed in this encounter.      Meds This Visit:  Requested Prescriptions     Signed Prescriptions Disp Refills    hyoscyamine sulfate 0.125 MG Oral Tablet Dispersible 60 tablet 0     Sig: Place 1 tablet (0.125 mg total) under the tongue every 4 (four) hours as needed.    cyclobenzaprine 5 MG Oral Tab 30 tablet 0     Sig: Take 1 tablet (5 mg total) by mouth nightly.    LORazepam 0.5 MG Oral Tab 60 tablet 0     Sig: Take 1 tablet (0.5 mg total) by mouth 2 (two) times daily as needed for Anxiety.    methylPREDNISolone (MEDROL) 4 MG Oral Tablet Therapy Pack 1 each 0     Sig: As directed.       Imaging & Referrals:  None     Patient Instructions   Cyclobenzaprine 5 mg prescribed to be taken twice daily as needed for muscle spasm.  Medrol Dosepak also prescribed.  Lorazepam 0.5 mg orally twice daily for anxiety related symptoms.  Patient has IBS flare and needs hyoscyamine refilled.  Increase clear water intake.  Continue with mental health counselor.  Patient encouraged to reach back out to the clinic for lab follow-up.  Patient will likely benefit from osteopathic manipulation regarding her occipital/cervical discomfort/tension.    Return in about 4 weeks (around 7/18/2025), or if symptoms worsen or fail to improve.         [1]   Current Outpatient Medications   Medication Sig Dispense Refill    hyoscyamine sulfate 0.125 MG Oral Tablet Dispersible Place 1 tablet (0.125 mg total) under the tongue every 4 (four) hours as needed. 60 tablet 0    cyclobenzaprine 5 MG Oral Tab Take 1 tablet (5 mg  total) by mouth nightly. 30 tablet 0    LORazepam 0.5 MG Oral Tab Take 1 tablet (0.5 mg total) by mouth 2 (two) times daily as needed for Anxiety. 60 tablet 0    methylPREDNISolone (MEDROL) 4 MG Oral Tablet Therapy Pack As directed. 1 each 0    ibuprofen 600 MG Oral Tab Take 1 tablet (600 mg total) by mouth every 8 (eight) hours. 30 tablet 0    losartan-hydroCHLOROthiazide 100-25 MG Oral Tab Take 1 tablet by mouth daily. 90 tablet 3    amLODIPine 2.5 MG Oral Tab Take 1 tablet (2.5 mg total) by mouth daily. 90 tablet 3    fluticasone propionate 50 MCG/ACT Nasal Suspension 2 sprays by Nasal route daily. 48 g 3    simvastatin 20 MG Oral Tab Take 1 tablet (20 mg total) by mouth nightly. 90 tablet 1    fexofenadine-pseudoephedrine ER  MG Oral Tablet 12 Hr Take 1 tablet by mouth 2 (two) times daily. 30 tablet 0    cyclobenzaprine 10 MG Oral Tab Take 1 tablet (10 mg total) by mouth 3 (three) times daily as needed for Muscle spasms. 270 tablet 0    hydrochlorothiazide 25 MG Oral Tab Take 1 tablet (25 mg total) by mouth daily. 90 tablet 1    SYNJARDY XR 12.5-1000 MG Oral Tablet 24 Hr TAKE 1 TABLET WITH BREAKFAST TWICE A DAY ORALLY      Zolpidem Tartrate 10 MG Oral Tab Take 1 tablet (10 mg total) by mouth nightly as needed for Sleep. 30 tablet 1    Glucose Blood (ONETOUCH VERIO) In Vitro Strip Use to test blood glucose level 1 time per day 100 each 0    ONETOUCH DELICA LANCETS 33G Does not apply Misc Use to test blood glucose level daily 100 each 0   [2]   Allergies  Allergen Reactions    Cherry     Cherry OTHER (SEE COMMENTS)   [3]   Past Medical History:   Anxiety state, unspecified    Diabetes (HCC)    High blood pressure    Unspecified essential hypertension   [4]   Past Surgical History:  Procedure Laterality Date    Electrocardiogram, complete  5/12/2012    scanned to media tab   [5]   Family History  Problem Relation Age of Onset    Hypertension Mother     Hypertension Father     Diabetes Father     Lipids Father          hyperlipidemia    Glaucoma Father     Cancer Maternal Grandfather         lung    Ovarian Cancer Paternal Grandmother         70's   [6]   Social History  Socioeconomic History    Marital status: Single   Tobacco Use    Smoking status: Never    Smokeless tobacco: Never   Vaping Use    Vaping status: Never Used   Substance and Sexual Activity    Alcohol use: Yes     Comment: beer & liquor, 2 glasses occasionally    Drug use: No   Other Topics Concern    Caffeine Concern Yes     Comment: coffee, soda    Left Handed No    Right Handed Yes    Currently spends a great deal of time in the sun No    History of tanning No    Hx of Spending Great Deal of Time in Sun No    Bad sunburns in the past No    Tanning Salons in the Past No    Hx of Radiation Treatments No

## 2025-07-17 DIAGNOSIS — G44.209 MUSCLE TENSION HEADACHE: ICD-10-CM

## 2025-07-22 RX ORDER — CYCLOBENZAPRINE HCL 5 MG
5 TABLET ORAL NIGHTLY
Qty: 30 TABLET | Refills: 0 | Status: SHIPPED | OUTPATIENT
Start: 2025-07-22

## 2025-08-12 ENCOUNTER — PATIENT MESSAGE (OUTPATIENT)
Dept: FAMILY MEDICINE CLINIC | Facility: CLINIC | Age: 55
End: 2025-08-12

## 2025-08-12 DIAGNOSIS — Z12.31 SCREENING MAMMOGRAM FOR BREAST CANCER: Primary | ICD-10-CM

## (undated) NOTE — LETTER
7/24/2019          To Whom It May Concern:    Karlee Hollis is currently under my medical care and may not return to work at this time. Please excuse Simah for 5 days. She may return to work on 7/29/19. Activity is restricted as follows: none.     If

## (undated) NOTE — MR AVS SNAPSHOT
[facility]  [address], [address]  [phone]               Thank you for choosing us for your health care visit with Caitlin Pappas DO.   We are glad to serve you and happy to provide you with this sum [facility] [id number]   Phone:  [phone]   Fax:  [phone]    Diagnoses:  Uncontrolled type 2 diabetes mellitus without complication, without long-term current use of insulin [facility]   Order:  Endocrinology - Internal    Vikas Peralta MD   [id number] [facility] Allergies as of Mar 03, 2017     Cherry                 Today's Vital Signs     BP Pulse Temp Height Weight BMI    138/86 mmHg 81 98.2 °F (36.8 °C) (Oral) 5' 4\" (1.626 m) 275 lb (124.739 kg) 47.18 kg/m2         Current Medications          This list is ac If you've recently had a stay at the Hospital you can access your discharge instructions in [facility] by going to Visits < Admission Summaries.  If you've been to the Emergency Department or your doctor's office, you can view your past visit information in My

## (undated) NOTE — LETTER
8/24/2017          To Whom It May Concern:    Alena Villegas is currently under my medical care and may return to work at this time. Please excuse Simnathan for part of today, 08/24/17. She was at her doctor's appointment.   She may return to work on 08/24/

## (undated) NOTE — LETTER
20      Patient: Jud Taylor  : 7/10/1970 Visit date: 3/5/2020    Dear Dillon Sierra,      I examined your patient in consultation today. She has mild left ulnar nerve compression at the elbow. She will obtain an elbow pad.     Sh